# Patient Record
Sex: FEMALE | Race: WHITE | NOT HISPANIC OR LATINO
[De-identification: names, ages, dates, MRNs, and addresses within clinical notes are randomized per-mention and may not be internally consistent; named-entity substitution may affect disease eponyms.]

---

## 2017-01-04 ENCOUNTER — RX RENEWAL (OUTPATIENT)
Age: 67
End: 2017-01-04

## 2017-03-08 ENCOUNTER — MESSAGE (OUTPATIENT)
Age: 67
End: 2017-03-08

## 2017-03-08 ENCOUNTER — INPATIENT (INPATIENT)
Facility: HOSPITAL | Age: 67
LOS: 4 days | Discharge: HOME HEALTH | DRG: 641 | End: 2017-03-13
Attending: INTERNAL MEDICINE | Admitting: INTERNAL MEDICINE
Payer: MEDICARE

## 2017-03-08 VITALS
RESPIRATION RATE: 17 BRPM | WEIGHT: 177.91 LBS | HEART RATE: 75 BPM | TEMPERATURE: 97 F | DIASTOLIC BLOOD PRESSURE: 68 MMHG | OXYGEN SATURATION: 96 % | HEIGHT: 63 IN | SYSTOLIC BLOOD PRESSURE: 120 MMHG

## 2017-03-08 LAB
ALBUMIN SERPL ELPH-MCNC: 2.4 G/DL — LOW (ref 3.4–5)
ALP SERPL-CCNC: 136 U/L — HIGH (ref 40–120)
ALT FLD-CCNC: 17 U/L — SIGNIFICANT CHANGE UP (ref 12–42)
ANION GAP SERPL CALC-SCNC: 9 MMOL/L — SIGNIFICANT CHANGE UP (ref 9–16)
ANISOCYTOSIS BLD QL: SLIGHT — SIGNIFICANT CHANGE UP
APPEARANCE UR: CLEAR — SIGNIFICANT CHANGE UP
APTT BLD: 31.3 SEC — SIGNIFICANT CHANGE UP (ref 27.5–37.4)
AST SERPL-CCNC: 22 U/L — SIGNIFICANT CHANGE UP (ref 15–37)
BACTERIA # UR AUTO: PRESENT /HPF
BASOPHILS NFR BLD AUTO: 0.5 % — SIGNIFICANT CHANGE UP (ref 0–2)
BILIRUB SERPL-MCNC: 1.2 MG/DL — SIGNIFICANT CHANGE UP (ref 0.2–1.2)
BILIRUB UR-MCNC: (no result)
BUN SERPL-MCNC: 11 MG/DL — SIGNIFICANT CHANGE UP (ref 7–23)
CALCIUM SERPL-MCNC: 8.6 MG/DL — SIGNIFICANT CHANGE UP (ref 8.5–10.5)
CHLORIDE SERPL-SCNC: 107 MMOL/L — SIGNIFICANT CHANGE UP (ref 96–108)
CK MB CFR SERPL CALC: 1.2 NG/ML — SIGNIFICANT CHANGE UP (ref 0.5–3.6)
CK SERPL-CCNC: 103 U/L — SIGNIFICANT CHANGE UP (ref 26–192)
CO2 SERPL-SCNC: 24 MMOL/L — SIGNIFICANT CHANGE UP (ref 22–31)
COLOR SPEC: YELLOW — SIGNIFICANT CHANGE UP
CREAT SERPL-MCNC: 0.96 MG/DL — SIGNIFICANT CHANGE UP (ref 0.5–1.3)
DACRYOCYTES BLD QL SMEAR: SLIGHT — SIGNIFICANT CHANGE UP
DIFF PNL FLD: NEGATIVE — SIGNIFICANT CHANGE UP
EOSINOPHIL NFR BLD AUTO: 5 % — SIGNIFICANT CHANGE UP (ref 0–6)
EPI CELLS # UR: (no result) /HPF
GIANT PLATELETS BLD QL SMEAR: PRESENT — SIGNIFICANT CHANGE UP
GLUCOSE SERPL-MCNC: 85 MG/DL — SIGNIFICANT CHANGE UP (ref 70–99)
GLUCOSE UR QL: NEGATIVE — SIGNIFICANT CHANGE UP
HCT VFR BLD CALC: 29.2 % — LOW (ref 34.5–45)
HGB BLD-MCNC: 9.1 G/DL — LOW (ref 11.5–15.5)
HYPOCHROMIA BLD QL: SLIGHT — SIGNIFICANT CHANGE UP
INR BLD: 1.34 — HIGH (ref 0.88–1.16)
KETONES UR-MCNC: 15 MG/DL
LEUKOCYTE ESTERASE UR-ACNC: NEGATIVE — SIGNIFICANT CHANGE UP
LG PLATELETS BLD QL AUTO: PRESENT — SIGNIFICANT CHANGE UP
LYMPHOCYTES # BLD AUTO: 3 % — LOW (ref 13–44)
MAGNESIUM SERPL-MCNC: 1.8 MG/DL — SIGNIFICANT CHANGE UP (ref 1.6–2.4)
MCHC RBC-ENTMCNC: 29.3 PG — SIGNIFICANT CHANGE UP (ref 27–34)
MCHC RBC-ENTMCNC: 31.2 G/DL — LOW (ref 32–36)
MCV RBC AUTO: 93.9 FL — SIGNIFICANT CHANGE UP (ref 80–100)
MONOCYTES NFR BLD AUTO: 15 % — HIGH (ref 2–14)
NEUTROPHILS NFR BLD AUTO: 76 % — SIGNIFICANT CHANGE UP (ref 43–77)
NEUTS BAND # BLD: 1 % — SIGNIFICANT CHANGE UP
NITRITE UR-MCNC: NEGATIVE — SIGNIFICANT CHANGE UP
OVALOCYTES BLD QL SMEAR: SLIGHT — SIGNIFICANT CHANGE UP
PH UR: 5 — SIGNIFICANT CHANGE UP (ref 4–8)
PLAT MORPH BLD: (no result)
PLATELET # BLD AUTO: 128 K/UL — LOW (ref 150–400)
POLYCHROMASIA BLD QL SMEAR: SLIGHT — SIGNIFICANT CHANGE UP
POTASSIUM SERPL-MCNC: 3.6 MMOL/L — SIGNIFICANT CHANGE UP (ref 3.5–5.3)
POTASSIUM SERPL-SCNC: 3.6 MMOL/L — SIGNIFICANT CHANGE UP (ref 3.5–5.3)
PROT SERPL-MCNC: 6.4 G/DL — SIGNIFICANT CHANGE UP (ref 6.4–8.2)
PROT UR-MCNC: (no result) MG/DL
PROTHROM AB SERPL-ACNC: 14.9 SEC — HIGH (ref 10–13.1)
RBC # BLD: 3.11 M/UL — LOW (ref 3.8–5.2)
RBC # FLD: 15.7 % — SIGNIFICANT CHANGE UP (ref 10.3–16.9)
RBC BLD AUTO: (no result)
SODIUM SERPL-SCNC: 140 MMOL/L — SIGNIFICANT CHANGE UP (ref 135–145)
SP GR SPEC: 1.02 — SIGNIFICANT CHANGE UP (ref 1–1.03)
TROPONIN I SERPL-MCNC: 0.02 NG/ML — SIGNIFICANT CHANGE UP (ref 0.01–0.04)
TSH SERPL-MCNC: 2.33 UIU/ML — SIGNIFICANT CHANGE UP (ref 0.35–4.94)
UROBILINOGEN FLD QL: 1 E.U./DL — SIGNIFICANT CHANGE UP
WBC # BLD: 4 K/UL — SIGNIFICANT CHANGE UP (ref 3.8–10.5)
WBC # FLD AUTO: 4 K/UL — SIGNIFICANT CHANGE UP (ref 3.8–10.5)
WBC UR QL: < 5 /HPF — SIGNIFICANT CHANGE UP

## 2017-03-08 PROCEDURE — 93010 ELECTROCARDIOGRAM REPORT: CPT

## 2017-03-08 PROCEDURE — 71010: CPT | Mod: 26

## 2017-03-08 PROCEDURE — 99285 EMERGENCY DEPT VISIT HI MDM: CPT | Mod: 25

## 2017-03-08 RX ORDER — MORPHINE SULFATE 50 MG/1
4 CAPSULE, EXTENDED RELEASE ORAL ONCE
Qty: 0 | Refills: 0 | Status: DISCONTINUED | OUTPATIENT
Start: 2017-03-08 | End: 2017-03-08

## 2017-03-08 RX ADMIN — MORPHINE SULFATE 4 MILLIGRAM(S): 50 CAPSULE, EXTENDED RELEASE ORAL at 22:30

## 2017-03-08 RX ADMIN — MORPHINE SULFATE 4 MILLIGRAM(S): 50 CAPSULE, EXTENDED RELEASE ORAL at 21:23

## 2017-03-08 NOTE — ED PROVIDER NOTE - OBJECTIVE STATEMENT
weakness  65 yo with breast CA , generalized weakness, lack of appetite and decreased PO intake, felt wobbly for the last few days , one week after her last chemo, yesterday fell to the ground as her daughter reports her legs gave out. pt denies injury, has pain at L breast particularly at sight of ozzing under breast which has been chronic but recently worsening. no fevers,no cough, no headache, no black or bloody stools, no urinary symptoms reports leg edema is chronic and chronically worse on the L side , no SOB,

## 2017-03-08 NOTE — ED ADULT NURSE NOTE - CHPI ED SYMPTOMS NEG
no chills/no nausea/no vomiting/no dizziness/no numbness/no fever/no tingling/no decreased eating/drinking/no pain

## 2017-03-08 NOTE — ED PROVIDER NOTE - PHYSICAL EXAMINATION
CHest wall: L breast hardened with chronic radiation changes and likely from Breast CA, nodular texture, under L breast oozing from rash but no fluctuance,

## 2017-03-08 NOTE — ED ADULT TRIAGE NOTE - CHIEF COMPLAINT QUOTE
pt c/o increased weakness since Friday. pt also c/o discharge from under left breast, was unable to see wound nurse last week. pt is currently under chemo for breast cancer.

## 2017-03-08 NOTE — ED PROVIDER NOTE - MUSCULOSKELETAL, MLM
Spine appears normal, range of motion is not limited,+ bilateral lower extremity edema L slightly greater that R although pt reports this assymetry is chronic for her

## 2017-03-08 NOTE — ED ADULT NURSE NOTE - OBJECTIVE STATEMENT
Pt came to ED complaining of generalized weakness x2 weeks. Pt has breast CA and undergoing chemotherapy. Pt reports fall at home today. Pt denies LOC or head trauma. Positive PMS x4 extremities, Pt is alert and oriented x3. Pt has oozing from left breast.  Pt denies chest pain, SOB, abd pain, /GI symptoms.

## 2017-03-08 NOTE — ED PROVIDER NOTE - MEDICAL DECISION MAKING DETAILS
65 YO w generalized weakness, + breast CA, consider secondary to lack of oral intake/ dehydration, no apparent pna or UTI, abd soft and NT, will admit to medicine for further evaluation and treatment.

## 2017-03-09 DIAGNOSIS — E11.9 TYPE 2 DIABETES MELLITUS WITHOUT COMPLICATIONS: ICD-10-CM

## 2017-03-09 DIAGNOSIS — Z41.8 ENCOUNTER FOR OTHER PROCEDURES FOR PURPOSES OTHER THAN REMEDYING HEALTH STATE: ICD-10-CM

## 2017-03-09 DIAGNOSIS — R63.8 OTHER SYMPTOMS AND SIGNS CONCERNING FOOD AND FLUID INTAKE: ICD-10-CM

## 2017-03-09 DIAGNOSIS — R53.1 WEAKNESS: ICD-10-CM

## 2017-03-09 DIAGNOSIS — I10 ESSENTIAL (PRIMARY) HYPERTENSION: ICD-10-CM

## 2017-03-09 DIAGNOSIS — N18.3 CHRONIC KIDNEY DISEASE, STAGE 3 (MODERATE): ICD-10-CM

## 2017-03-09 DIAGNOSIS — C50.919 MALIGNANT NEOPLASM OF UNSPECIFIED SITE OF UNSPECIFIED FEMALE BREAST: ICD-10-CM

## 2017-03-09 DIAGNOSIS — Z90.12 ACQUIRED ABSENCE OF LEFT BREAST AND NIPPLE: Chronic | ICD-10-CM

## 2017-03-09 DIAGNOSIS — Z95.810 PRESENCE OF AUTOMATIC (IMPLANTABLE) CARDIAC DEFIBRILLATOR: Chronic | ICD-10-CM

## 2017-03-09 DIAGNOSIS — I50.9 HEART FAILURE, UNSPECIFIED: ICD-10-CM

## 2017-03-09 LAB
ANION GAP SERPL CALC-SCNC: 11 MMOL/L — SIGNIFICANT CHANGE UP (ref 9–16)
BUN SERPL-MCNC: 13 MG/DL — SIGNIFICANT CHANGE UP (ref 7–23)
CALCIUM SERPL-MCNC: 8.4 MG/DL — LOW (ref 8.5–10.5)
CHLORIDE SERPL-SCNC: 107 MMOL/L — SIGNIFICANT CHANGE UP (ref 96–108)
CO2 SERPL-SCNC: 23 MMOL/L — SIGNIFICANT CHANGE UP (ref 22–31)
CREAT SERPL-MCNC: 1.19 MG/DL — SIGNIFICANT CHANGE UP (ref 0.5–1.3)
EOSINOPHIL NFR BLD AUTO: 4 % — SIGNIFICANT CHANGE UP (ref 0–6)
FERRITIN SERPL-MCNC: 388.3 NG/ML — HIGH (ref 8–252)
GLUCOSE SERPL-MCNC: 106 MG/DL — HIGH (ref 70–99)
HBA1C BLD-MCNC: 6.6 % — HIGH (ref 4.8–5.6)
HCT VFR BLD CALC: 26.7 % — LOW (ref 34.5–45)
HGB BLD-MCNC: 8.2 G/DL — LOW (ref 11.5–15.5)
IRON SATN MFR SERPL: 27 % — SIGNIFICANT CHANGE UP (ref 20–38)
IRON SATN MFR SERPL: 44 UG/DL — LOW (ref 50–170)
LYMPHOCYTES # BLD AUTO: 3 % — LOW (ref 13–44)
MAGNESIUM SERPL-MCNC: 1.9 MG/DL — SIGNIFICANT CHANGE UP (ref 1.6–2.4)
MCHC RBC-ENTMCNC: 28.8 PG — SIGNIFICANT CHANGE UP (ref 27–34)
MCHC RBC-ENTMCNC: 30.7 G/DL — LOW (ref 32–36)
MCV RBC AUTO: 93.7 FL — SIGNIFICANT CHANGE UP (ref 80–100)
MONOCYTES NFR BLD AUTO: 13 % — SIGNIFICANT CHANGE UP (ref 2–14)
NEUTROPHILS NFR BLD AUTO: 79 % — HIGH (ref 43–77)
PLATELET # BLD AUTO: 116 K/UL — LOW (ref 150–400)
POTASSIUM SERPL-MCNC: 3.6 MMOL/L — SIGNIFICANT CHANGE UP (ref 3.5–5.3)
POTASSIUM SERPL-SCNC: 3.6 MMOL/L — SIGNIFICANT CHANGE UP (ref 3.5–5.3)
RBC # BLD: 2.85 M/UL — LOW (ref 3.8–5.2)
RBC # FLD: 16.4 % — SIGNIFICANT CHANGE UP (ref 10.3–16.9)
SODIUM SERPL-SCNC: 141 MMOL/L — SIGNIFICANT CHANGE UP (ref 135–145)
TIBC SERPL-MCNC: 161 UG/DL — LOW (ref 250–450)
TSH SERPL-MCNC: 2.96 UIU/ML — SIGNIFICANT CHANGE UP (ref 0.35–4.94)
WBC # BLD: 4.1 K/UL — SIGNIFICANT CHANGE UP (ref 3.8–10.5)
WBC # FLD AUTO: 4.1 K/UL — SIGNIFICANT CHANGE UP (ref 3.8–10.5)

## 2017-03-09 PROCEDURE — 99223 1ST HOSP IP/OBS HIGH 75: CPT

## 2017-03-09 RX ORDER — MORPHINE SULFATE 50 MG/1
2 CAPSULE, EXTENDED RELEASE ORAL ONCE
Qty: 0 | Refills: 0 | Status: DISCONTINUED | OUTPATIENT
Start: 2017-03-09 | End: 2017-03-09

## 2017-03-09 RX ORDER — INSULIN LISPRO 100/ML
8 VIAL (ML) SUBCUTANEOUS
Qty: 0 | Refills: 0 | Status: DISCONTINUED | OUTPATIENT
Start: 2017-03-09 | End: 2017-03-13

## 2017-03-09 RX ORDER — DIAZEPAM 5 MG
2 TABLET ORAL EVERY 12 HOURS
Qty: 0 | Refills: 0 | Status: DISCONTINUED | OUTPATIENT
Start: 2017-03-09 | End: 2017-03-13

## 2017-03-09 RX ORDER — FENTANYL CITRATE 50 UG/ML
1 INJECTION INTRAVENOUS
Qty: 0 | Refills: 0 | COMMUNITY

## 2017-03-09 RX ORDER — OXYCODONE HYDROCHLORIDE 5 MG/1
5 TABLET ORAL ONCE
Qty: 0 | Refills: 0 | Status: DISCONTINUED | OUTPATIENT
Start: 2017-03-09 | End: 2017-03-09

## 2017-03-09 RX ORDER — ONDANSETRON 8 MG/1
1 TABLET, FILM COATED ORAL
Qty: 0 | Refills: 0 | COMMUNITY

## 2017-03-09 RX ORDER — FENTANYL CITRATE 50 UG/ML
1 INJECTION INTRAVENOUS
Qty: 0 | Refills: 0 | Status: DISCONTINUED | OUTPATIENT
Start: 2017-03-09 | End: 2017-03-13

## 2017-03-09 RX ORDER — OXYCODONE HYDROCHLORIDE 5 MG/1
15 TABLET ORAL EVERY 4 HOURS
Qty: 0 | Refills: 0 | Status: DISCONTINUED | OUTPATIENT
Start: 2017-03-09 | End: 2017-03-13

## 2017-03-09 RX ORDER — ONDANSETRON 8 MG/1
4 TABLET, FILM COATED ORAL EVERY 8 HOURS
Qty: 0 | Refills: 0 | Status: DISCONTINUED | OUTPATIENT
Start: 2017-03-09 | End: 2017-03-13

## 2017-03-09 RX ORDER — INSULIN LISPRO 100/ML
8 VIAL (ML) SUBCUTANEOUS
Qty: 0 | Refills: 0 | COMMUNITY

## 2017-03-09 RX ORDER — MORPHINE SULFATE 50 MG/1
2 CAPSULE, EXTENDED RELEASE ORAL EVERY 4 HOURS
Qty: 0 | Refills: 0 | Status: DISCONTINUED | OUTPATIENT
Start: 2017-03-09 | End: 2017-03-09

## 2017-03-09 RX ORDER — INSULIN LISPRO 100/ML
VIAL (ML) SUBCUTANEOUS
Qty: 0 | Refills: 0 | Status: DISCONTINUED | OUTPATIENT
Start: 2017-03-09 | End: 2017-03-13

## 2017-03-09 RX ORDER — METOPROLOL TARTRATE 50 MG
1 TABLET ORAL
Qty: 0 | Refills: 0 | COMMUNITY

## 2017-03-09 RX ORDER — OXYCODONE HYDROCHLORIDE 5 MG/1
2 TABLET ORAL
Qty: 0 | Refills: 0 | COMMUNITY

## 2017-03-09 RX ORDER — OXYCODONE HYDROCHLORIDE 5 MG/1
15 TABLET ORAL ONCE
Qty: 0 | Refills: 0 | Status: DISCONTINUED | OUTPATIENT
Start: 2017-03-09 | End: 2017-03-09

## 2017-03-09 RX ORDER — INSULIN GLARGINE 100 [IU]/ML
60 INJECTION, SOLUTION SUBCUTANEOUS AT BEDTIME
Qty: 0 | Refills: 0 | Status: DISCONTINUED | OUTPATIENT
Start: 2017-03-09 | End: 2017-03-09

## 2017-03-09 RX ORDER — DIAZEPAM 5 MG
1 TABLET ORAL
Qty: 0 | Refills: 0 | COMMUNITY

## 2017-03-09 RX ORDER — ENOXAPARIN SODIUM 100 MG/ML
40 INJECTION SUBCUTANEOUS DAILY
Qty: 0 | Refills: 0 | Status: DISCONTINUED | OUTPATIENT
Start: 2017-03-09 | End: 2017-03-13

## 2017-03-09 RX ORDER — ASPIRIN/CALCIUM CARB/MAGNESIUM 324 MG
81 TABLET ORAL DAILY
Qty: 0 | Refills: 0 | Status: DISCONTINUED | OUTPATIENT
Start: 2017-03-09 | End: 2017-03-13

## 2017-03-09 RX ORDER — INSULIN GLARGINE 100 [IU]/ML
20 INJECTION, SOLUTION SUBCUTANEOUS AT BEDTIME
Qty: 0 | Refills: 0 | Status: DISCONTINUED | OUTPATIENT
Start: 2017-03-09 | End: 2017-03-10

## 2017-03-09 RX ORDER — ACETAMINOPHEN 500 MG
2 TABLET ORAL
Qty: 0 | Refills: 0 | COMMUNITY

## 2017-03-09 RX ORDER — METOPROLOL TARTRATE 50 MG
100 TABLET ORAL DAILY
Qty: 0 | Refills: 0 | Status: DISCONTINUED | OUTPATIENT
Start: 2017-03-09 | End: 2017-03-13

## 2017-03-09 RX ORDER — ASPIRIN/CALCIUM CARB/MAGNESIUM 324 MG
1 TABLET ORAL
Qty: 0 | Refills: 0 | COMMUNITY

## 2017-03-09 RX ORDER — SODIUM CHLORIDE 9 MG/ML
1000 INJECTION INTRAMUSCULAR; INTRAVENOUS; SUBCUTANEOUS
Qty: 0 | Refills: 0 | Status: DISCONTINUED | OUTPATIENT
Start: 2017-03-09 | End: 2017-03-10

## 2017-03-09 RX ORDER — POLYETHYLENE GLYCOL 3350 17 G/17G
17 POWDER, FOR SOLUTION ORAL DAILY
Qty: 0 | Refills: 0 | Status: DISCONTINUED | OUTPATIENT
Start: 2017-03-09 | End: 2017-03-13

## 2017-03-09 RX ADMIN — Medication 81 MILLIGRAM(S): at 09:37

## 2017-03-09 RX ADMIN — OXYCODONE HYDROCHLORIDE 15 MILLIGRAM(S): 5 TABLET ORAL at 14:52

## 2017-03-09 RX ADMIN — Medication 8 UNIT(S): at 09:47

## 2017-03-09 RX ADMIN — INSULIN GLARGINE 20 UNIT(S): 100 INJECTION, SOLUTION SUBCUTANEOUS at 22:10

## 2017-03-09 RX ADMIN — SODIUM CHLORIDE 100 MILLILITER(S): 9 INJECTION INTRAMUSCULAR; INTRAVENOUS; SUBCUTANEOUS at 02:46

## 2017-03-09 RX ADMIN — Medication 8 UNIT(S): at 13:23

## 2017-03-09 RX ADMIN — Medication 100 MILLIGRAM(S): at 06:46

## 2017-03-09 RX ADMIN — Medication 2 MILLIGRAM(S): at 17:22

## 2017-03-09 RX ADMIN — OXYCODONE HYDROCHLORIDE 15 MILLIGRAM(S): 5 TABLET ORAL at 10:30

## 2017-03-09 RX ADMIN — OXYCODONE HYDROCHLORIDE 15 MILLIGRAM(S): 5 TABLET ORAL at 15:45

## 2017-03-09 RX ADMIN — OXYCODONE HYDROCHLORIDE 5 MILLIGRAM(S): 5 TABLET ORAL at 05:46

## 2017-03-09 RX ADMIN — POLYETHYLENE GLYCOL 3350 17 GRAM(S): 17 POWDER, FOR SOLUTION ORAL at 09:37

## 2017-03-09 RX ADMIN — FENTANYL CITRATE 1 PATCH: 50 INJECTION INTRAVENOUS at 17:22

## 2017-03-09 RX ADMIN — OXYCODONE HYDROCHLORIDE 15 MILLIGRAM(S): 5 TABLET ORAL at 09:48

## 2017-03-09 RX ADMIN — Medication 8 UNIT(S): at 18:05

## 2017-03-09 RX ADMIN — MORPHINE SULFATE 2 MILLIGRAM(S): 50 CAPSULE, EXTENDED RELEASE ORAL at 11:25

## 2017-03-09 RX ADMIN — MORPHINE SULFATE 2 MILLIGRAM(S): 50 CAPSULE, EXTENDED RELEASE ORAL at 02:46

## 2017-03-09 RX ADMIN — OXYCODONE HYDROCHLORIDE 5 MILLIGRAM(S): 5 TABLET ORAL at 04:46

## 2017-03-09 RX ADMIN — MORPHINE SULFATE 2 MILLIGRAM(S): 50 CAPSULE, EXTENDED RELEASE ORAL at 03:01

## 2017-03-09 RX ADMIN — ENOXAPARIN SODIUM 40 MILLIGRAM(S): 100 INJECTION SUBCUTANEOUS at 09:38

## 2017-03-09 RX ADMIN — Medication 50 MILLIGRAM(S): at 17:22

## 2017-03-09 RX ADMIN — MORPHINE SULFATE 2 MILLIGRAM(S): 50 CAPSULE, EXTENDED RELEASE ORAL at 11:09

## 2017-03-09 NOTE — H&P ADULT - PROBLEM SELECTOR PLAN 5
Per daughter mother has Hx of CKD stage 3. Current calculated GFR Of Per daughter mother has Hx of CKD stage 3. Currently BUN/Cr: 11/0.96. Current calculated GFR of 71 placing patient at Stage 2  -Cont with Maintaince fluid ON

## 2017-03-09 NOTE — CONSULT NOTE ADULT - SUBJECTIVE AND OBJECTIVE BOX
Pain Management Consult Note - [facility] [phone]    Chief Complaint:  breast cancer  HPI:  Pt is a 67 yo F with Pmhx of HTN, HLD, DM2, CKD Stage 3, HF s/p AICD placement, previous MI,  Left Breast Ca s/p Lumpectomy Radiation and Chemotherapy previously in Remission with reoccurrence in November 2016 with Bone mets who presents to the ED for weakness, poor PO intake and recent fall. Per daughter at the bedside her mother had been doing relatively ok and tolerating her chemo session well. However since Her last session on Friday she has noted her mother to be weaker. Yesterday as she was standing she fell to the floor, stating that her legs gave out. The fall was witnessed by her daughter and the pt did not hit her head. The patient has been in tremendous pain due to wounds under her leftt breast. She developed ''foul smelling discharge'' under her left breast which requires wound care, however given her recent debilitating weakness she has missed her last appointments.  The patient states she is on Fentanyl patch 25 mcg/hr q72h, oxycodone 15 mg po TID, Lyrica 50 mg BID, Valium 2 mg po BID and this was confirmed in [facility].  Dr. Truong, pt's oncologist, writes pain medications.  Pt. complains of pain under the left breast and states she has been feeling "tired".  Pt. states oxycodone is helpful.          Pain is _x__ sharp __x__dull _x__burning _x__achy ___ Intensity: ____ mild ___mod ___severe     Location ____surgical site ____cervical _____lumbar ____abd ____upper ext____lower ext under left breast    Worse with __x__activity _x___movement _____physical therapy___ Rest  dressing changes    Improved with _x___medication __x__rest ____physical therapy    ROS: Const:  _-__febrile   Eyes:___ENT:_-__CV: _-__chest pain  Resp: __-__sob  GI:__-_nausea _-__vomiting _-__abd pain ___npo ___clears _+_full diet __bm  :_-__ Musk: __-_pain ___spasm  Skin:_+__ Neuro:  _-__kgplolih__-_havdkmkdd_-__ numbness -___weakness ___paresth  Psych:__anxiety  Endo:__+_ Heme:__-_Allergy:__codeine_______, ___all others reviewed and negative    PAST MEDICAL & SURGICAL HISTORY:  Neuropathy  Heart failure  AICD (automatic cardioverter/defibrillator) present  MI, old  Diabetes  HLD (hyperlipidemia)  HTN (hypertension)  Breast CA  Status post left breast lumpectomy  AICD (automatic cardioverter/defibrillator) present      SH: __-_Tobacco   _-__Alcohol                          FH:FAMILY HISTORY:  No pertinent family history in first degree relatives      enoxaparin Injectable 40milliGRAM(s) SubCutaneous daily  aspirin enteric coated 81milliGRAM(s) Oral daily  fentaNYL   Patch  25 MICROgram(s)/Hr 1Patch Transdermal every 72 hours  insulin lispro Injectable (HumaLOG) 8Unit(s) SubCutaneous three times a day before meals  ondansetron    Tablet 4milliGRAM(s) Oral every 8 hours PRN  metoprolol succinate ER 100milliGRAM(s) Oral daily  sodium chloride 0.9%. 1000milliLiter(s) IV Continuous <Continuous>  insulin lispro (HumaLOG) corrective regimen sliding scale  SubCutaneous three times a day before meals  polyethylene glycol 3350 17Gram(s) Oral daily  insulin glargine Injectable (LANTUS) 20Unit(s) SubCutaneous at bedtime  oxyCODONE IR 15milliGRAM(s) Oral every 4 hours PRN      T(C): 36.1, Max: 36.6 (03-09 @ 04:45)  HR: 77 (69 - 77)  BP: 109/60 (92/62 - 120/68)  RR: 18 (17 - 20)  SpO2: 97% (94% - 97%)  Wt(kg): --    T(C): 36.1, Max: 36.6 (03-09 @ 04:45)  HR: 77 (69 - 77)  BP: 109/60 (92/62 - 120/68)  RR: 18 (17 - 20)  SpO2: 97% (94% - 97%)  Wt(kg): --    T(C): 36.1, Max: 36.6 (03-09 @ 04:45)  HR: 77 (69 - 77)  BP: 109/60 (92/62 - 120/68)  RR: 18 (17 - 20)  SpO2: 97% (94% - 97%)  Wt(kg): --    PHYSICAL EXAM:  Gen Appearance: __x_no acute distress _x__appropriate        Neuro: __x_SILT feet__x__ EOM Intact Psych: AAOX__3, __x_mood/affect appropriate        Eyes: __x_conjunctiva WNL  __x___ Pupils equal and round        ENT: _x__ears and nose atraumatic_x__ Hearing grossly intact        Neck: ___trachea midline, no visible masses ___thyroid without palpable mass    Resp: _x__Nml WOB____No tactile fremitus ___clear to auscultation    Cardio: _x__extremities free from edema ____pedal pulses palpable    GI/Abdomen: __x_soft ___x__ Nontender______Nondistended_____HSM    Lymphatic: ___no palpable nodes in neck  ___no palpable nodes calves and feet    Skin/Wound: ___Incision, _x__Dressing c/d/i to left breast,   ____surrounding tissues soft,  ___drain/chest tube present____    Muscular: EHL __5_/5  Gastrocnemius___/5    _x__absent clubbing/cyanosis      ASSESSMENT: This is a 66y old Female with a history of   FAILURE TO THRIVE IN ADULT: FAILURE TO THRIVE IN ADULT  Neuropathy  Heart failure  AICD (automatic cardioverter/defibrillator) present  MI, old  Diabetes  HLD (hyperlipidemia)  HTN (hypertension)  Breast CA  Failure to thrive in adult  HTN (hypertension): HTN (hypertension)  CKD (chronic kidney disease) stage 3, GFR 30-59 ml/min: CKD (chronic kidney disease) stage 3, GFR 30-59 ml/min  Heart failure: Heart failure  Metastatic breast cancer: Metastatic breast cancer  Weakness: Weakness  Status post left breast lumpectomy  AICD (automatic cardioverter/defibrillator) present  WEAKNESS: WEAKNESS  left breast cancer and pain in the left breast that is moderately controlled per patient.     Recommended Treatment PLAN:  1..Suggest continue fentanyl patch 25 mcg/hr q72h  2.  Suggest continue oxycodone 15 mg po q4h prn  3.  Suggest continue lyrica 50 mg po BID  4.Suggest continue valium 2mg po BID prn  5.  Consider dilaudid 0.5 mg IV qd prn for dressing changes.

## 2017-03-09 NOTE — ADVANCED PRACTICE NURSE CONSULT - ASSESSMENT
Pt is a 67 yo F with Pmhx of HTN, HLD, DM2, CKD Stage 3, HF s/p AICD placement, previous MI,  Left Breast Ca s/p Lumpectomy Radiation and Chemotherapy previously in Remission with reoccurrence in November 2016 with Bone mets who presents to the ED for weakness, poor PO intake and recent fall. Pt with fungal rash beneath left breast, cleaned site well, dried, then placed InterDry sheet beneath breast. Pt premedicated prior to assessment.

## 2017-03-09 NOTE — H&P ADULT - PROBLEM SELECTOR PLAN 1
Pt presenting with worsening weakness, poor PO intake and recent mechanical fall. Pt's weakness likely multifactorial: Metastatic breast cancer, immunosuppression from chemotherapy and deconditioning  -Pt looks significantly dry on physical exam  -Will start on Maintaince fluid.  -TSH added pending  -Pt consult in the am Pt presenting with worsening weakness, poor PO intake and recent mechanical fall. Pt's weakness likely multifactorial: Metastatic breast cancer, immunosuppression from chemotherapy and deconditioning  -Pt looks significantly dry on physical exam, Will start on Maintaince fluid.  -TSH added pending for further eval of weakness  -Pt consult in the am

## 2017-03-09 NOTE — DIETITIAN INITIAL EVALUATION ADULT. - ENERGY NEEDS
BMI:31.1,IBW:120lbs,149% of IBW. No N/V/D or pain. Noted derm/breast oozing issues.Flds per MD due to renal insufficiency.

## 2017-03-09 NOTE — H&P ADULT - ASSESSMENT
67 Yo F with PMhx as mentioned above presenting with weakness and decrease PO intake in setting of Breast Malignancy

## 2017-03-09 NOTE — H&P ADULT - PROBLEM SELECTOR PLAN 2
Pt with Metastatic left breast cancer to the Bone.  -Left breast significantly hardened with darkening of the skin. Breast is almost immobile. There are skin wounds under the left breast tissue with strong Malodorous discharge. Pt also with significant lymphedema of her LUE as well as tenderness  -Wound care in the morning  -Cont with Morphine and Percocet PRN for pain  -Contact outpatient Oncologist in the am for further collateral. Dr Zhu has not seen the patient in clinic in over a year  -Pt would like to remain full code.

## 2017-03-09 NOTE — PHYSICAL THERAPY INITIAL EVALUATION ADULT - ADDITIONAL COMMENTS
Pt reports prior to hospitalization she was ambulating independently using a straight cane. No HHA. Lived with daughter who worked during the day however would assist in taking her to appointments.

## 2017-03-09 NOTE — PHYSICAL THERAPY INITIAL EVALUATION ADULT - ACTIVE RANGE OF MOTION EXAMINATION, REHAB EVAL
bilateral UE AROM dec: RUE AROM 90 degrees, LUE AROM 45 degrees/bilateral  lower extremity Active ROM was WFL (within functional limits)

## 2017-03-09 NOTE — DIETITIAN INITIAL EVALUATION ADULT. - OTHER INFO
67 y/o female admitted with FTT.Noted extensive comorbidities.As per RN,patient ate 80% of breakfast tray without incident.No N/V/D or complaints of pain

## 2017-03-09 NOTE — PATIENT PROFILE ADULT. - VISION (WITH CORRECTIVE LENSES IF THE PATIENT USUALLY WEARS THEM):
wears glasses for near sightedness/Partially impaired: cannot see medication labels or newsprint, but can see obstacles in path, and the surrounding layout; can count fingers at arm's length

## 2017-03-09 NOTE — H&P ADULT - NSHPLABSRESULTS_GEN_ALL_CORE
.  LABS:                         9.1    4.0   )-----------( 128      ( 08 Mar 2017 21:03 )             29.2     08 Mar 2017 21:03    140    |  107    |  11     ----------------------------<  85     3.6     |  24     |  0.96     Ca    8.6        08 Mar 2017 21:03  Mg     1.8       08 Mar 2017 21:03    TPro  6.4    /  Alb  2.4    /  TBili  1.2    /  DBili  x      /  AST  22     /  ALT  17     /  AlkPhos  136    08 Mar 2017 21:03    PT/INR - ( 08 Mar 2017 21:03 )   PT: 14.9 sec;   INR: 1.34          PTT - ( 08 Mar 2017 21:03 )  PTT:31.3 sec  Urinalysis Basic - ( 08 Mar 2017 22:41 )    Color: Yellow / Appearance: Clear / S.020 / pH: x  Gluc: x / Ketone: 15 mg/dL  / Bili: Moderate / Urobili: 1.0 E.U./dL   Blood: x / Protein: Trace mg/dL / Nitrite: NEGATIVE   Leuk Esterase: NEGATIVE / RBC: x / WBC < 5 /HPF   Sq Epi: x / Non Sq Epi: Many /HPF / Bacteria: Present /HPF      CARDIAC MARKERS ( 08 Mar 2017 21:03 )  0.016 ng/mL / x     / 103 U/L / x     / 1.2 ng/mL      RADIOLOGY, EKG & ADDITIONAL TESTS: Reviewed.

## 2017-03-09 NOTE — H&P ADULT - HISTORY OF PRESENT ILLNESS
In the ED VS: T 97.7 BP: 111-120/68-69 HR:69-75 RR:17-18 Satting 94-96 RA  ED administrations: Morphine 4 mg IV x1 Pt is a 67 yo F with Pmhx of HTN, HLD, DM2, CKD Stage 3, HF s/p AICD placement, previous MI,  Left Breast Ca s/p Lumpectomy Radiation and Chemotherapy previously in Remission with reoccurrence in November 2016 with Bone mets who presents to the ED for weakness, poor PO intake and recent fall. Per daughter at the bedside her mother had been doing relatively ok and tolerating her chemo session well. However since Her last session on Friday she has noted her mother to be weaker. Yesterday as she was standing she fell to the floor, as she felt her legs give out. The fall was witnessed by her daughter and the pt did not hit her head. Daughter at the bedside also mentions that her mother has been in tremendous pain as well at the site of her breast. She developped ''foul smelling discharge'' under her left breast which requires wound care, however given her recent debilitating weakness she has missed her last appointments.  ROS is negative for fever, chills, productive cough, n/v, GI disturbances or dysuria. The patient's daughter has just been diagnosed with PNA    In the ED VS: T 97.7 BP: 111-120/68-69 HR:69-75 RR:17-18 Satting 94-96 RA  ED administrations: Morphine 4 mg IV x1 Pt is a 65 yo F with Pmhx of HTN, HLD, DM2, CKD Stage 3, HF s/p AICD placement, previous MI,  Left Breast Ca s/p Lumpectomy Radiation and Chemotherapy previously in Remission with reoccurrence in November 2016 with Bone mets who presents to the ED for weakness, poor PO intake and recent fall. Per daughter at the bedside her mother had been doing relatively ok and tolerating her chemo session well. However since Her last session on Friday she has noted her mother to be weaker. Yesterday as she was standing she fell to the floor, stating that her legs gave out. The fall was witnessed by her daughter and the pt did not hit her head. The patient has been in tremendous pain due to wounds under her leftt breast. She developed ''foul smelling discharge'' under her left breast which requires wound care, however given her recent debilitating weakness she has missed her last appointments.  ROS is negative for fever, chills, productive cough, n/v, GI disturbances or dysuria. The patient's daughter who lived with her has just been diagnosed with PNA    In the ED VS: T 97.7 BP: 111-120/68-69 HR:69-75 RR:17-18 Satting 94-96 RA  ED administrations: Morphine 4 mg IV x1

## 2017-03-09 NOTE — H&P ADULT - PROBLEM SELECTOR PLAN 4
Pt with Hx of HF s/p MI. EF is unknown. Pt has an AICD in place  -Pt on home Losartan, Lasix, ASA and Metoprolol  -Will hold Lasix for now as patient dehydrated  -Will resume Metoprolol Succ  however dose needs further clarification as patient reports taking it twice a day  -Pravastatin held since chemotherapy 2/2 severe leg cramps

## 2017-03-09 NOTE — PHYSICAL THERAPY INITIAL EVALUATION ADULT - PERTINENT HX OF CURRENT PROBLEM, REHAB EVAL
67 Yo F with PMhx as mentioned above presenting with weakness and decrease PO intake in setting of Breast Malignancy.

## 2017-03-09 NOTE — H&P ADULT - PROBLEM SELECTOR PLAN 3
Pt with History of Diabetes. Reports using 60 units of Lantus at bedtime and 18-25 of Premeal.  -BG on BMP at 86. PT's TDD based on weight should be 40  -Given Lantus dose incredibly high with pt's low BG on bmp, will dose for 20 of lantus and 8 of premeal as well as sliding scale  -Modify regimen based on FS  -A1c in the am   -Lipid Profile

## 2017-03-09 NOTE — H&P ADULT - PROBLEM SELECTOR PLAN 6
Holding home Losartan and Lasix  -Resume when BP allows  -Pt's BP sensitive to Morphine. Holding home Losartan, Norvasc and Lasix  -Resume when BP allows  -Pt's BP sensitive to Morphine.

## 2017-03-09 NOTE — CONSULT NOTE ADULT - SUBJECTIVE AND OBJECTIVE BOX
Patient is a 66y old  Female who presents with a chief complaint of Generalized weakness, fall and increased drainage from under left breast. (09 Mar 2017 01:57)      HPI:  Pt is a 65 yo F with Pmhx of HTN, HLD, DM2, CKD Stage 3, HF s/p AICD placement, previous MI,  Left Breast Ca s/p Lumpectomy Radiation and Chemotherapy previously in Remission with reoccurrence in 2016 with Bone mets who presents to the ED for weakness, poor PO intake and recent fall. Per daughter at the bedside her mother had been doing relatively ok and tolerating her chemo session well. However since Her last session on Friday she has noted her mother to be weaker. Yesterday as she was standing she fell to the floor, stating that her legs gave out. The fall was witnessed by her daughter and the pt did not hit her head. The patient has been in tremendous pain due to wounds under her leftt breast. She developed ''foul smelling discharge'' under her left breast which requires wound care, however given her recent debilitating weakness she has missed her last appointments.  ROS is negative for fever, chills, productive cough, n/v, GI disturbances or dysuria. The patient's daughter who lived with her has just been diagnosed with PNA    In the ED VS: T 97.7 BP: 111-120/68-69 HR:69-75 RR:17-18 Satting 94-96 RA  ED administrations: Morphine 4 mg IV x1 (09 Mar 2017 00:35)      PAST MEDICAL & SURGICAL HISTORY:  Neuropathy  Heart failure  AICD (automatic cardioverter/defibrillator) present  MI, old  Diabetes  HLD (hyperlipidemia)  HTN (hypertension)  Breast CA  Status post left breast lumpectomy  AICD (automatic cardioverter/defibrillator) present      MEDICATIONS  (STANDING):  enoxaparin Injectable 40milliGRAM(s) SubCutaneous daily  aspirin enteric coated 81milliGRAM(s) Oral daily  fentaNYL   Patch  25 MICROgram(s)/Hr 1Patch Transdermal every 72 hours  insulin lispro Injectable (HumaLOG) 8Unit(s) SubCutaneous three times a day before meals  metoprolol succinate ER 100milliGRAM(s) Oral daily  sodium chloride 0.9%. 1000milliLiter(s) IV Continuous <Continuous>  insulin lispro (HumaLOG) corrective regimen sliding scale  SubCutaneous three times a day before meals  polyethylene glycol 3350 17Gram(s) Oral daily  insulin glargine Injectable (LANTUS) 20Unit(s) SubCutaneous at bedtime  oxyCODONE IR 15milliGRAM(s) Oral once    MEDICATIONS  (PRN):  ondansetron    Tablet 4milliGRAM(s) Oral every 8 hours PRN Nausea and/or Vomiting      Social History: lives with daughter in an elevator accessible apartment building, no home care services    Functional Level Prior to Admission: reports ADL independent, walks without devices    FAMILY HISTORY:  No pertinent family history in first degree relatives      CBC Full  -  ( 09 Mar 2017 06:34 )  WBC Count : 4.1 K/uL  Hemoglobin : 8.2 g/dL  Hematocrit : 26.7 %  Platelet Count - Automated : 116 K/uL  Mean Cell Volume : 93.7 fL  Mean Cell Hemoglobin : 28.8 pg  Mean Cell Hemoglobin Concentration : 30.7 g/dL  Auto Neutrophil # : x  Auto Lymphocyte # : x  Auto Monocyte # : x  Auto Eosinophil # : x  Auto Basophil # : x  Auto Neutrophil % : 79.0 %  Auto Lymphocyte % : 3.0 %  Auto Monocyte % : 13.0 %  Auto Eosinophil % : 4.0 %  Auto Basophil % : x      09 Mar 2017 06:34    141    |  107    |  13     ----------------------------<  106    3.6     |  23     |  1.19     Ca    8.4        09 Mar 2017 06:34  Mg     1.9       09 Mar 2017 06:34    TPro  6.4    /  Alb  2.4    /  TBili  1.2    /  DBili  x      /  AST  22     /  ALT  17     /  AlkPhos  136    08 Mar 2017 21:03      Urinalysis Basic - ( 08 Mar 2017 22:41 )    Color: Yellow / Appearance: Clear / S.020 / pH: x  Gluc: x / Ketone: 15 mg/dL  / Bili: Moderate / Urobili: 1.0 E.U./dL   Blood: x / Protein: Trace mg/dL / Nitrite: NEGATIVE   Leuk Esterase: NEGATIVE / RBC: x / WBC < 5 /HPF   Sq Epi: x / Non Sq Epi: Many /HPF / Bacteria: Present /HPF      Radiology:          Vital Signs Last 24 Hrs  T(C): 36.6, Max: 36.6 ( @ 04:45)  T(F): 97.9, Max: 97.9 (03-09 @ 04:45)  HR: 74 (69 - 75)  BP: 112/71 (92/62 - 120/68)  BP(mean): 83 (83 - 83)  RR: 20 (17 - 20)  SpO2: 97% (94% - 97%)    REVIEW OF SYSTEMS:    CONSTITUTIONAL: fatigue, weakness  EYES: No eye pain, visual disturbances, or discharge  ENMT:  No difficulty hearing, tinnitus, vertigo; No sinus or throat pain  NECK: No pain or stiffness  BREASTS: No pain, masses, or nipple discharge  RESPIRATORY: No cough, wheezing, chills or hemoptysis; No shortness of breath  CARDIOVASCULAR: No chest pain, palpitations, dizziness, or leg swelling  GASTROINTESTINAL: No abdominal or epigastric pain. No nausea, vomiting, or hematemesis; No diarrhea or constipation. No melena or hematochezia.  GENITOURINARY: No dysuria, frequency, hematuria, or incontinence  NEUROLOGICAL: No headaches, memory loss, loss of strength, numbness, or tremors  SKIN: No itching, burning, rashes, or lesions   LYMPH NODES: No enlarged glands  ENDOCRINE: No heat or cold intolerance; No hair loss  MUSCULOSKELETAL: No joint pain or swelling; No muscle, back, or extremity pain  PSYCHIATRIC: No depression, anxiety, mood swings, or difficulty sleeping  HEME/LYMPH: No easy bruising, or bleeding gums  ALLERGY AND IMMUNOLOGIC: No hives or eczema      Physical Exam: obese AA woman lying in bed, c/o fatigue    HEENT: normocephalic/ atraumatic, anicteric    Neck: supple, negative JVD, negative carotid bruits,    Chest: CTA bilaterally, neg wheeze, rhonchi, rales, crackles, egophany    Cardiovascular: regular rate and rhythm, neg murmurs/rubs/gallops    Abdomen: soft, non tender, obese, normal bowel sounds, neg hepatosplenomegaly    Extremities: LUE lymphedema, 2+ LE edema, neg calf tenderness, neg Eric's    Neurologic Exam:    Alert and oriented to person, place, date/year, speech fluent w/o dysarthria, repetition intact, comprehension intact,     Cranial Nerves:     II:                       pupils equal, round and reactive to light, visual fields intact   III/ IV/VI:             extraocular movements intact, neg nystagmus, ptosis  V:                      facial sensation intact, V1-3 normal  VII:                     face symmetric, no droop, normal eye closure and smile  VIII:                    hearing intact to finger rub bilaterally  IX/ X:                  soft palate rise symmetrical  XI:                      head turning, shoulder shrug normal  XII:                     tongue midline    Motor Exam:    Bilateral UE:        5/5 /intrinsics                            5/5 biceps/triceps/wrist extensors-flexors/deltoid                            negative pronator drift      Bilateral LE:        4-/5 hip flexors/adductors/abductors                            4/5 quadriceps/hamstrings                            4/5 dorsiflexors/plantar flexors/invertors-evertors    Sensory: intact to LT/PP in all UE/LE dermatomes    DTR:       = biceps/     triceps/     brachioradialis                =  patella/   medial hamstring/    ankle                 neg clonus                 neg Babinski                 neg Hoffmans      Gait:  NT        PM&R Impression:    1) s/p mechanical fall  2) deconditioned  3) gait dysfunction    Recommendations:    1) Physical therapy focusing on therapeutic exercises, bed mobility/transfer out of bed evaluation, progressive ambulation with assistive devices.    2) Disposition Plan: pending functional progress

## 2017-03-09 NOTE — PATIENT PROFILE ADULT. - FALL HARM RISK
coagulation(Bleeding disorder R/T clinical cond/anti-coags)/bones(Osteoporosis,prev fx,steroid use,metastatic bone ca/other/Weakness

## 2017-03-09 NOTE — H&P ADULT - NSHPPHYSICALEXAM_GEN_ALL_CORE
.  VITAL SIGNS:  T(C): 36.5, Max: 36.5 (03-08 @ 21:20)  T(F): 97.7, Max: 97.7 (03-08 @ 21:20)  HR: 72 (69 - 75)  BP: 92/62 (92/62 - 120/68)  BP(mean): 83 (83 - 83)  RR: 18 (17 - 20)  SpO2: 96% (94% - 96%)  Wt(kg): --    PHYSICAL EXAM:    Constitutional: Pt is in moderate distress 2.2 pain.  Head: NC/AT  Eyes: PERRL, EOMI, anicteric sclera  ENT: Dry MM. Halitosis  Neck: supple; no JVD   Breast: Left breast smaller than right.  darkenning and hardening of the skin. Foul smelling skin changes under the left breast with rash. Right breast non tender significantly more enlarged than left  Respiratory: CTA B/L in the apeces. Fine crackles at the bases  Cardiac: +S1/S2; RRR; no M/R/G;AICD in place  Gastrointestinal: soft, NT/ND; no rebound or guarding; +BSx4  Back: Seborrheic Keratosis all over the back  Extremities: Extensively dry scaly skin in the LE. LLE more edemartous> RLE. Lymphedema in the LUE  Vascular: 2+ radial, femoral, DP/PT pulses B/L  Dermatologic: DRY skin and seborreic skin changes troughout with stuck on apperance  Neurologic: AAOx3; CNII-XII grossly intact; no focal deficits

## 2017-03-10 ENCOUNTER — MESSAGE (OUTPATIENT)
Age: 67
End: 2017-03-10

## 2017-03-10 ENCOUNTER — TRANSCRIPTION ENCOUNTER (OUTPATIENT)
Age: 67
End: 2017-03-10

## 2017-03-10 LAB
CULTURE RESULTS: NO GROWTH — SIGNIFICANT CHANGE UP
SPECIMEN SOURCE: SIGNIFICANT CHANGE UP

## 2017-03-10 RX ORDER — INSULIN GLARGINE 100 [IU]/ML
60 INJECTION, SOLUTION SUBCUTANEOUS
Qty: 0 | Refills: 0 | COMMUNITY

## 2017-03-10 RX ORDER — ENOXAPARIN SODIUM 100 MG/ML
40 INJECTION SUBCUTANEOUS
Qty: 0 | Refills: 0 | COMMUNITY
Start: 2017-03-10

## 2017-03-10 RX ORDER — INSULIN GLARGINE 100 [IU]/ML
15 INJECTION, SOLUTION SUBCUTANEOUS AT BEDTIME
Qty: 0 | Refills: 0 | Status: DISCONTINUED | OUTPATIENT
Start: 2017-03-10 | End: 2017-03-13

## 2017-03-10 RX ORDER — POLYETHYLENE GLYCOL 3350 17 G/17G
17 POWDER, FOR SOLUTION ORAL
Qty: 0 | Refills: 0 | COMMUNITY
Start: 2017-03-10

## 2017-03-10 RX ADMIN — ENOXAPARIN SODIUM 40 MILLIGRAM(S): 100 INJECTION SUBCUTANEOUS at 12:25

## 2017-03-10 RX ADMIN — Medication 8 UNIT(S): at 18:47

## 2017-03-10 RX ADMIN — Medication 2 MILLIGRAM(S): at 19:00

## 2017-03-10 RX ADMIN — Medication 8 UNIT(S): at 13:26

## 2017-03-10 RX ADMIN — Medication 100 MILLIGRAM(S): at 05:34

## 2017-03-10 RX ADMIN — OXYCODONE HYDROCHLORIDE 15 MILLIGRAM(S): 5 TABLET ORAL at 05:33

## 2017-03-10 RX ADMIN — OXYCODONE HYDROCHLORIDE 15 MILLIGRAM(S): 5 TABLET ORAL at 21:30

## 2017-03-10 RX ADMIN — SODIUM CHLORIDE 100 MILLILITER(S): 9 INJECTION INTRAMUSCULAR; INTRAVENOUS; SUBCUTANEOUS at 05:32

## 2017-03-10 RX ADMIN — INSULIN GLARGINE 15 UNIT(S): 100 INJECTION, SOLUTION SUBCUTANEOUS at 23:30

## 2017-03-10 RX ADMIN — OXYCODONE HYDROCHLORIDE 15 MILLIGRAM(S): 5 TABLET ORAL at 06:00

## 2017-03-10 RX ADMIN — Medication 8 UNIT(S): at 09:00

## 2017-03-10 RX ADMIN — Medication 50 MILLIGRAM(S): at 05:33

## 2017-03-10 RX ADMIN — OXYCODONE HYDROCHLORIDE 15 MILLIGRAM(S): 5 TABLET ORAL at 20:51

## 2017-03-10 RX ADMIN — OXYCODONE HYDROCHLORIDE 15 MILLIGRAM(S): 5 TABLET ORAL at 10:45

## 2017-03-10 RX ADMIN — Medication 2 MILLIGRAM(S): at 05:34

## 2017-03-10 RX ADMIN — Medication 50 MILLIGRAM(S): at 18:47

## 2017-03-10 RX ADMIN — Medication 81 MILLIGRAM(S): at 12:25

## 2017-03-10 RX ADMIN — OXYCODONE HYDROCHLORIDE 15 MILLIGRAM(S): 5 TABLET ORAL at 09:46

## 2017-03-10 NOTE — PROGRESS NOTE ADULT - SUBJECTIVE AND OBJECTIVE BOX
PGY 1 PROGRESS NOTE:    S:  O/N-DIANA.  THIS MORNING-Reports improved pain control but feeling weak overall. ROS negative for F/C, N/V, C/D, CP, SOB, palpitations, urinary sx.     O;  Vital Signs Last 24 Hrs  T(C): 36.1, Max: 37.1 (03-09 @ 21:17)  T(F): 97, Max: 98.7 (03-09 @ 21:17)  HR: 68 (66 - 68)  BP: 102/69 (27/80 - 103/50)  BP(mean): --  RR: 18 (18 - 18)  SpO2: 96% (94% - 98%)    PHYSICAL EXAM:  General-elderly  female, NAD  HEENT-dry oral mucosa w/ white colored discharge on tongue  Lungs-CTAB  Heart-RRR, normal S1/S2  Chest- R breast swollen vs L, L breast with significant skin changes, exquisitely TTP, minimal discharge from region under L breast   Abdomen-soft, NTND, bowel sounds present  Extremities-wwp, no edema  Neuro-A&O x3, responds to all commands, moves all extremities    LABS: none    MEDICATIONS  (STANDING):  enoxaparin Injectable 40milliGRAM(s) SubCutaneous daily  aspirin enteric coated 81milliGRAM(s) Oral daily  fentaNYL   Patch  25 MICROgram(s)/Hr 1Patch Transdermal every 72 hours  insulin lispro Injectable (HumaLOG) 8Unit(s) SubCutaneous three times a day before meals  metoprolol succinate ER 100milliGRAM(s) Oral daily  insulin lispro (HumaLOG) corrective regimen sliding scale  SubCutaneous Before meals and at bedtime  polyethylene glycol 3350 17Gram(s) Oral daily  insulin glargine Injectable (LANTUS) 20Unit(s) SubCutaneous at bedtime  pregabalin 50milliGRAM(s) Oral every 12 hours  diazepam    Tablet 2milliGRAM(s) Oral every 12 hours    MEDICATIONS  (PRN):  ondansetron    Tablet 4milliGRAM(s) Oral every 8 hours PRN Nausea and/or Vomiting  oxyCODONE IR 15milliGRAM(s) Oral every 4 hours PRN Severe Pain (7 - 10) PGY 1 PROGRESS NOTE:    S:  O/N-DIANA.  THIS MORNING-Reports improved pain control but feeling weak overall. ROS negative for F/C, N/V, C/D, CP, SOB, palpitations, urinary sx.     O;  Vital Signs Last 24 Hrs  T(C): 36.1, Max: 37.1 (03-09 @ 21:17)  T(F): 97, Max: 98.7 (03-09 @ 21:17)  HR: 68 (66 - 68)  BP: 102/69   BP(mean): --  RR: 18 (18 - 18)  SpO2: 96% (94% - 98%)    PHYSICAL EXAM:  General-elderly  female, NAD  HEENT-dry oral mucosa w/ white colored discharge on tongue  Lungs-CTAB  Heart-RRR, normal S1/S2  Chest- R breast swollen vs L, L breast with significant skin changes, exquisitely TTP, minimal discharge from region under L breast   Abdomen-soft, NTND, bowel sounds present  Extremities-wwp, no edema  Neuro-A&O x3, responds to all commands, moves all extremities    LABS: none    MEDICATIONS  (STANDING):  enoxaparin Injectable 40milliGRAM(s) SubCutaneous daily  aspirin enteric coated 81milliGRAM(s) Oral daily  fentaNYL   Patch  25 MICROgram(s)/Hr 1Patch Transdermal every 72 hours  insulin lispro Injectable (HumaLOG) 8Unit(s) SubCutaneous three times a day before meals  metoprolol succinate ER 100milliGRAM(s) Oral daily  insulin lispro (HumaLOG) corrective regimen sliding scale  SubCutaneous Before meals and at bedtime  polyethylene glycol 3350 17Gram(s) Oral daily  insulin glargine Injectable (LANTUS) 20Unit(s) SubCutaneous at bedtime  pregabalin 50milliGRAM(s) Oral every 12 hours  diazepam    Tablet 2milliGRAM(s) Oral every 12 hours    MEDICATIONS  (PRN):  ondansetron    Tablet 4milliGRAM(s) Oral every 8 hours PRN Nausea and/or Vomiting  oxyCODONE IR 15milliGRAM(s) Oral every 4 hours PRN Severe Pain (7 - 10) PGY 1 PROGRESS NOTE:    S:  O/N-DIANA.  THIS MORNING-Reports improved pain control but feeling weak overall. ROS negative for F/C, N/V, C/D, CP, SOB, palpitations, urinary sx.     O;  Vital Signs Last 24 Hrs  T(C): 36.1, Max: 37.1 (03-09 @ 21:17)  T(F): 97, Max: 98.7 (03-09 @ 21:17)  HR: 68 (66 - 68)  BP: 102/69   BP(mean): --  RR: 18 (18 - 18)  SpO2: 96% (94% - 98%)    PHYSICAL EXAM:  General-elderly  female, NAD  HEENT-dry oral mucosa w/ white colored discharge on tongue  Lungs-CTAB  Heart-RRR, normal S1/S2  Chest- R breast swollen vs L, L breast with significant skin changes, exquisitely TTP, minimal discharge from region under L breast   Abdomen-soft, NTND, bowel sounds present  [id number], no edema. RUE-wwp, no edema. LUE-significant pitting lymphedema extending to mid arm  Neuro-A&O x3, responds to all commands, moves all extremities    LABS: none    MEDICATIONS  (STANDING):  enoxaparin Injectable 40milliGRAM(s) SubCutaneous daily  aspirin enteric coated 81milliGRAM(s) Oral daily  fentaNYL   Patch  25 MICROgram(s)/Hr 1Patch Transdermal every 72 hours  insulin lispro Injectable (HumaLOG) 8Unit(s) SubCutaneous three times a day before meals  metoprolol succinate ER 100milliGRAM(s) Oral daily  insulin lispro (HumaLOG) corrective regimen sliding scale  SubCutaneous Before meals and at bedtime  polyethylene glycol 3350 17Gram(s) Oral daily  insulin glargine Injectable (LANTUS) 20Unit(s) SubCutaneous at bedtime  pregabalin 50milliGRAM(s) Oral every 12 hours  diazepam    Tablet 2milliGRAM(s) Oral every 12 hours    MEDICATIONS  (PRN):  ondansetron    Tablet 4milliGRAM(s) Oral every 8 hours PRN Nausea and/or Vomiting  oxyCODONE IR 15milliGRAM(s) Oral every 4 hours PRN Severe Pain (7 - 10)

## 2017-03-10 NOTE — DISCHARGE NOTE ADULT - SECONDARY DIAGNOSIS.
Metastatic breast cancer CKD (chronic kidney disease) stage 3, GFR 30-59 ml/min Diabetes HTN (hypertension) Nutrition, metabolism, and development symptoms Need for prophylactic measure

## 2017-03-10 NOTE — PROGRESS NOTE ADULT - PROBLEM SELECTOR PLAN 4
Pt with Hx of HF s/p MI. EF is unknown. Pt has an AICD in place  -Pt on home Losartan, Lasix, ASA and Metoprolol  -Will hold Lasix for now as patient dehydrated  -Will resume Metoprolol Succ  however dose needs further clarification as patient reports taking it twice a day  -Pravastatin held since chemotherapy 2/2 severe leg cramps -patient w/ known h/o HF s/p MI (EF unknown), s/p AICD placement  -c/w home metoprolol succinate 100 mg BID  -holding home losartan, lasix in setting of normotension  -holding home pravastatin in setting of significant leg cramping   -c/w home ASA

## 2017-03-10 NOTE — DISCHARGE NOTE ADULT - MEDICATION SUMMARY - MEDICATIONS TO TAKE
I will START or STAY ON the medications listed below when I get home from the hospital:    aspirin 81 mg oral tablet  -- 1 tab(s) by mouth once a day  -- Indication: For Need for prophylactic measure    fentaNYL 25 mcg/hr transdermal film, extended release  -- 1 patch by transdermal patch every 72 hours  -- Indication: For Metastatic breast cancer    acetaminophen 325 mg oral tablet  -- 2 tab(s) by mouth once a day, As Needed  -- Indication: For Metastatic breast cancer    oxyCODONE 5 mg oral capsule  -- 2 tab(s) by mouth every 4 hours, As Needed  -- Indication: For Metastatic breast cancer    losartan 50 mg oral tablet  -- 1 tab(s) by mouth once a day  -- Indication: For HTN (hypertension)    diazePAM 2 mg oral tablet  -- 1 tab(s) by mouth 2 times a day, As Needed  -- Indication: For Metastatic breast cancer    Lyrica 50 mg oral capsule  -- 1 cap(s) by mouth every 12 hours  -- Indication: For Metastatic breast cancer    Lantus 100 units/mL subcutaneous solution  -- 20 unit(s) subcutaneous once a day  -- Indication: For Diabetes    HumaLOG 100 units/mL subcutaneous solution  -- 8 unit(s) subcutaneous 3 times a day  -- Indication: For Diabetes    Zofran 4 mg oral tablet  -- 1 tab(s) by mouth every 8 hours, As Needed  -- Indication: For Nausea    Metoprolol Succinate  mg oral tablet, extended release  -- 1 tab(s) by mouth once a day  -- Indication: For Heart failure    Norvasc 10 mg oral tablet  -- 1 tab(s) by mouth once a day  -- Indication: For HTN (hypertension)    Lasix 40 mg oral tablet  -- 1 tab(s) by mouth once a day  -- Indication: For Heart failure    polyethylene glycol 3350 oral powder for reconstitution  -- 17 gram(s) by mouth once a day  -- Indication: For Need for prophylactic measure I will START or STAY ON the medications listed below when I get home from the hospital:    aspirin 81 mg oral tablet  -- 1 tab(s) by mouth once a day  -- Indication: For Need for prophylactic measure    fentaNYL 25 mcg/hr transdermal film, extended release  -- 1 patch by transdermal patch every 72 hours  -- Indication: For Metastatic breast cancer    acetaminophen 325 mg oral tablet  -- 2 tab(s) by mouth once a day, As Needed  -- Indication: For Metastatic breast cancer    oxyCODONE 5 mg oral capsule  -- 2 tab(s) by mouth every 4 hours, As Needed  -- Indication: For Metastatic breast cancer    diazePAM 2 mg oral tablet  -- 1 tab(s) by mouth 2 times a day, As Needed  -- Indication: For Metastatic breast cancer    pregabalin 50 mg oral capsule  -- 1 cap(s) by mouth every 8 hours  -- Indication: For Pain    insulin glargine  -- 15 unit(s) subcutaneous once a day (at bedtime)  -- Indication: For Diabetes    HumaLOG 100 units/mL subcutaneous solution  -- 8 unit(s) subcutaneous 3 times a day  -- Indication: For Diabetes    Zofran 4 mg oral tablet  -- 1 tab(s) by mouth every 8 hours, As Needed  -- Indication: For Nausea    Metoprolol Succinate  mg oral tablet, extended release  -- 1 tab(s) by mouth once a day  -- Indication: For Heart failure    polyethylene glycol 3350 oral powder for reconstitution  -- 17 gram(s) by mouth once a day  -- Indication: For Need for prophylactic measure I will START or STAY ON the medications listed below when I get home from the hospital:    aspirin 81 mg oral tablet  -- 1 tab(s) by mouth once a day  -- Indication: For Need for prophylactic measure    fentaNYL 25 mcg/hr transdermal film, extended release  -- 1 patch by transdermal patch every 72 hours  -- Indication: For Metastatic breast cancer    acetaminophen 325 mg oral tablet  -- 2 tab(s) by mouth once a day, As Needed  -- Indication: For Metastatic breast cancer    oxyCODONE 5 mg oral capsule  -- 2 tab(s) by mouth every 4 hours, As Needed  -- Indication: For Metastatic breast cancer    diazePAM 2 mg oral tablet  -- 1 tab(s) by mouth 2 times a day, As Needed  -- Indication: For Metastatic breast cancer    pregabalin 50 mg oral capsule  -- 1 cap(s) by mouth every 8 hours MDD:3 tabs  -- Indication: For Metastatic breast cancer    insulin glargine  -- 15 unit(s) subcutaneous once a day (at bedtime)  -- Indication: For Diabetes    HumaLOG 100 units/mL subcutaneous solution  -- 8 unit(s) subcutaneous 3 times a day  -- Indication: For Diabetes    Zofran 4 mg oral tablet  -- 1 tab(s) by mouth every 8 hours, As Needed  -- Indication: For Nausea    Metoprolol Succinate  mg oral tablet, extended release  -- 1 tab(s) by mouth once a day  -- Indication: For Heart failure    polyethylene glycol 3350 oral powder for reconstitution  -- 17 gram(s) by mouth once a day  -- Indication: For Constipation

## 2017-03-10 NOTE — PROGRESS NOTE ADULT - PROBLEM SELECTOR PLAN 1
Pt presenting with worsening weakness, poor PO intake and recent mechanical fall. Pt's weakness likely multifactorial: Metastatic breast cancer, immunosuppression from chemotherapy and deconditioning  -Pt looks significantly dry on physical exam, Will start on Maintaince fluid.  -TSH added pending for further eval of weakness  -Pt consult in the am -on admission, patient reported recent h/o weakness, poor PO intake and recent mechanical fall. TSH WNL.   -etiology likely 2/2 deconditioning vs underlying malignancy vs chemotherapy side effects   -c/w supportive care, encourage PO   -per PT, plan to dc to [facility]

## 2017-03-10 NOTE — PROGRESS NOTE ADULT - SUBJECTIVE AND OBJECTIVE BOX
Physical Medicine and Rehabilitation Progress Note:    Patient is a 66y old  Female who presents with a chief complaint of Generalized weakness, fall and increased drainage from under left breast. (10 Mar 2017 13:45)      HPI:  Pt is a 65 yo F with Pmhx of HTN, HLD, DM2, CKD Stage 3, HF s/p AICD placement, previous MI,  Left Breast Ca s/p Lumpectomy Radiation and Chemotherapy previously in Remission with reoccurrence in November 2016 with Bone mets who presents to the ED for weakness, poor PO intake and recent fall. Per daughter at the bedside her mother had been doing relatively ok and tolerating her chemo session well. However since Her last session on Friday she has noted her mother to be weaker. Yesterday as she was standing she fell to the floor, stating that her legs gave out. The fall was witnessed by her daughter and the pt did not hit her head. The patient has been in tremendous pain due to wounds under her leftt breast. She developed ''foul smelling discharge'' under her left breast which requires wound care, however given her recent debilitating weakness she has missed her last appointments.  ROS is negative for fever, chills, productive cough, n/v, GI disturbances or dysuria. The patient's daughter who lived with her has just been diagnosed with PNA    In the ED VS: T 97.7 BP: 111-120/68-69 HR:69-75 RR:17-18 Satting 94-96 RA  ED administrations: Morphine 4 mg IV x1 (09 Mar 2017 00:35)                            8.2    4.1   )-----------( 116      ( 09 Mar 2017 06:34 )             26.7       09 Mar 2017 06:34    141    |  107    |  13     ----------------------------<  106    3.6     |  23     |  1.19     Ca    8.4        09 Mar 2017 06:34  Mg     1.9       09 Mar 2017 06:34    TPro  6.4    /  Alb  2.4    /  TBili  1.2    /  DBili  x      /  AST  22     /  ALT  17     /  AlkPhos  136    08 Mar 2017 21:03    Vital Signs Last 24 Hrs  T(C): 36.1, Max: 37.1 (03-09 @ 21:17)  T(F): 97, Max: 98.7 (03-09 @ 21:17)  HR: 68 (66 - 68)  BP: 102/69 (27/80 - 103/50)  BP(mean): --  RR: 18 (18 - 18)  SpO2: 96% (94% - 98%)    MEDICATIONS  (STANDING):  enoxaparin Injectable 40milliGRAM(s) SubCutaneous daily  aspirin enteric coated 81milliGRAM(s) Oral daily  fentaNYL   Patch  25 MICROgram(s)/Hr 1Patch Transdermal every 72 hours  insulin lispro Injectable (HumaLOG) 8Unit(s) SubCutaneous three times a day before meals  metoprolol succinate ER 100milliGRAM(s) Oral daily  insulin lispro (HumaLOG) corrective regimen sliding scale  SubCutaneous Before meals and at bedtime  polyethylene glycol 3350 17Gram(s) Oral daily  pregabalin 50milliGRAM(s) Oral every 12 hours  diazepam    Tablet 2milliGRAM(s) Oral every 12 hours  insulin glargine Injectable (LANTUS) 15Unit(s) SubCutaneous at bedtime    MEDICATIONS  (PRN):  ondansetron    Tablet 4milliGRAM(s) Oral every 8 hours PRN Nausea and/or Vomiting  oxyCODONE IR 15milliGRAM(s) Oral every 4 hours PRN Severe Pain (7 - 10)    Currently Undergoing Physical Therapy at bedside.    Initial Functional Status Assessment:    Previous Level of Function:   · Ambulation Skills	needs device	  · Transfer Skills	needs device	  · ADL Skills	needs device	  · Work/Leisure Activity	needs device	  · Additional Comments	Pt reports prior to hospitalization she was ambulating independently using a straight cane. No HHA. Lived with daughter who worked during the day however would assist in taking her to appointments.	    Cognitive Status Examination:   · Orientation	oriented to person, place, time and situation	  · Level of Consciousness	alert; groggy	  · Follows Commands and Answers Questions	100% of the time	  · Personal Safety and Judgment	impaired	    Range of Motion Exam:   · Active Range of Motion Examination	bilateral  lower extremity Active ROM was WFL (within functional limits); bilateral UE AROM dec: RUE AROM 90 degrees, LUE AROM 45 degrees	    Manual Muscle Testing:   · Manual Muscle Testing Results	grossly assessed due to, functional mobility testing; >/=3/5 b/l UE and LE	  Bed Mobility: Rolling/Turning:   · Level of [place]	supervision	  · Assistive Device	bed rails	    Bed Mobility: Sit to Supine:   · Level of [place]	contact guard	  · Physical Assist/Nonphysical Assist	verbal cues	  · Assistive Device	bed rails	    Bed Mobility: Supine to Sit:   · Level of [place]	contact guard	  · Physical Assist/Nonphysical Assist	verbal cues	    Bed Mobility Analysis:   · Bed Mobility Limitations	decreased ability to use arms for pushing/pulling	  · Impairments Contributing to Impaired Bed Mobility	decreased strength; decreased ROM; endurance decreased	    Transfer: Sit to Stand:   · Level of [place]	moderate assist (50% patients effort)	  · Physical Assist/Nonphysical Assist	verbal cues	  · Weight-Bearing Restrictions	full weight-bearing	  · Assistive Device	rolling walker	    Transfer: Stand to Sit:   · Level of [place]	supervision	  · Weight-Bearing Restrictions	full weight-bearing	  · Assistive Device	rolling walker	    Sit/Stand Transfer Safety Analysis:   · Transfer Safety Concerns Noted	decreased sequencing ability; decreased step length	  · Impairments Contributing to Impaired Transfers	decreased ROM; decreased strength; endurance decreased	    Gait Skills:   · Level of [place]	contact guard	  · Physical Assist/Nonphysical Assist	verbal cues	  · Weight-Bearing Restrictions	full weight-bearing	  · Assistive Device	rolling walker	  · Gait Distance	3 side steps to L	  Gait Analysis:   · Gait Pattern Used	side-stepping	  · Gait Deviations Noted	decreased step length	  · Impairments Contributing to Gait Deviations	impaired balance; decreased strength; decreased ROM; endurance decreased	    Balance Skills Assessment:   · Sitting Balance: Static	good balance	  · Sitting Balance: Dynamic	fair balance	  · Sit-to-Stand Balance	fair balance	  · Standing Balance: Static	fair balance	  · Standing Balance: Dynamic	fair balance	    Clinical Impressions:   · Criteria for Skilled Therapeutic Interventions	impairments found; functional limitations in following categories; rehab potential	  · Impairments Found (describe specific impairments)	aerobic capacity/endurance; muscle strength; gait, locomotion, and balance	  · Rehab Potential	good, to achieve stated therapy goals	  · Therapy Frequency	2-3x/week	    PM&R Impression: as above    Disposition Plan Recommendations: pending functional progress

## 2017-03-10 NOTE — PROGRESS NOTE ADULT - SUBJECTIVE AND OBJECTIVE BOX
Pt. seen at 1013  Pain Management Progress Note - [facility] [phone]    HPI:  Pt. states she has pain in the left breast and under the left breast.  Describes it as  burning.  Pt. states oxycodone is helpful and states she feels "sleepy".              Pertinent PMH: Pain at: ___Back ___Neck___Knee ___Hip ___Shoulder _x__ Opioid tolerance    Pain is ___ sharp ____dull __x_burning ___achy ___ Intensity: ____ mild ____mod ____severe     Location _____surgical site _____cervical _____lumbar ____abd _____upper ext____lower ext Left breast    Worse with __x__activity __x__movement _____physical therapy___ Rest    Improved with __x__medication __x__rest ____physical therapy      enoxaparin Injectable  aspirin enteric coated  fentaNYL   Patch  25 MICROgram(s)/Hr  insulin lispro Injectable (HumaLOG)  ondansetron    Tablet  metoprolol succinate ER  insulin lispro (HumaLOG) corrective regimen sliding scale  polyethylene glycol 3350  oxyCODONE IR  pregabalin  diazepam    Tablet  insulin glargine Injectable (LANTUS)      ROS: Const:  ___febrile   Eyes:___ENT:___CV: _-__chest pain  Resp: __-__sob  GI:_-__nausea __-_vomiting __+__abd pain ___npo ___clears _+__full diet __bm  :___ Musk: ___pain ___spasm  Skin:___ Neuro:  _-__ubltogwb__-_wevneeydx____ numbness ___weakness ___paresthesia  Psych:___anxiety  Endo:___ Heme:___Allergy:___          Hemoglobin: 8.2 g/dL (03-09 @ 06:34)  Hemoglobin: 9.1 g/dL (03-08 @ 21:03)        T(C): 36.1, Max: 37.1 (03-09 @ 21:17)  HR: 68 (66 - 68)  BP: 102/69 (27/80 - 103/50)  RR: 18 (18 - 18)  SpO2: 96% (94% - 98%)  Wt(kg): --     PHYSICAL EXAM:  Gen Appearance: _x__no acute distress _x__appropriate         Neuro: _x__SILT feet___x_ EOM Intact Psych: AAOX3__, _x__mood/affect appropriate        Eyes: _x__conjunctiva WNL  __x___ Pupils equal and round        ENT: __x_ears and nose atraumatic_x__ Hearing grossly intact        Neck: ___trachea midline, no visible masses ___thyroid without palpable mass    Resp: _x__Nml WOB____No tactile fremitus ___clear to auscultation    Cardio: ___extremities free from edema ____pedal pulses palpable    GI/Abdomen: _x__soft __x___ Nontender______Nondistended_____HSM    Lymphatic: ___no palpable nodes in neck  ___no palpable nodes calves and feet    Skin/Wound: ___Incision, ___Dressing c/d/i,   ____surrounding tissues soft,  ___drain/chest tube present____    Muscular: EHL _5__/5  Gastrocnemius___/5    __x_absent clubbing/cyanosis         ASSESSMENT:  This is a 66y old Female with a history of:  FAILURE TO THRIVE IN ADULT  Neuropathy  Heart failure  AICD (automatic cardioverter/defibrillator) present  MI, old  Diabetes  HLD (hyperlipidemia)  HTN (hypertension)  Breast CA  Failure to thrive in adult  CKD (chronic kidney disease) stage 3, GFR 30-59 ml/min  Heart failure  Diabetes  Metastatic breast cancer  Need for prophylactic measure  Nutrition, metabolism, and development symptoms  Weakness  Status post left breast lumpectomy  AICD (automatic cardioverter/defibrillator) present  WEAKNESS  breast cancer and pain in the left breast         Recommended Treatment PLAN: Pt. seen at 1013  Pain Management Progress Note - [facility] [phone]    HPI:  Pt. states she has pain in the left breast and under the left breast.  Describes it as  burning.  Pt. states oxycodone is helpful and states she feels "sleepy".              Pertinent PMH: Pain at: ___Back ___Neck___Knee ___Hip ___Shoulder _x__ Opioid tolerance    Pain is ___ sharp ____dull __x_burning ___achy ___ Intensity: ____ mild ____mod ____severe     Location _____surgical site _____cervical _____lumbar ____abd _____upper ext____lower ext Left breast    Worse with __x__activity __x__movement _____physical therapy___ Rest    Improved with __x__medication __x__rest ____physical therapy      enoxaparin Injectable  aspirin enteric coated  fentaNYL   Patch  25 MICROgram(s)/Hr  insulin lispro Injectable (HumaLOG)  ondansetron    Tablet  metoprolol succinate ER  insulin lispro (HumaLOG) corrective regimen sliding scale  polyethylene glycol 3350  oxyCODONE IR  pregabalin  diazepam    Tablet  insulin glargine Injectable (LANTUS)      ROS: Const:  ___febrile   Eyes:___ENT:___CV: _-__chest pain  Resp: __-__sob  GI:_-__nausea __-_vomiting __+__abd pain ___npo ___clears _+__full diet __bm  :___ Musk: ___pain ___spasm  Skin:___ Neuro:  _-__ufjkhkki__-_ufmmtzwpp____ numbness ___weakness ___paresthesia  Psych:___anxiety  Endo:___ Heme:___Allergy:___          Hemoglobin: 8.2 g/dL (03-09 @ 06:34)  Hemoglobin: 9.1 g/dL (03-08 @ 21:03)        T(C): 36.1, Max: 37.1 (03-09 @ 21:17)  HR: 68 (66 - 68)  BP: 102/69 (27/80 - 103/50)  RR: 18 (18 - 18)  SpO2: 96% (94% - 98%)  Wt(kg): --     PHYSICAL EXAM:  Gen Appearance: _x__no acute distress _x__appropriate         Neuro: _x__SILT feet___x_ EOM Intact Psych: AAOX3__, _x__mood/affect appropriate        Eyes: _x__conjunctiva WNL  __x___ Pupils equal and round        ENT: __x_ears and nose atraumatic_x__ Hearing grossly intact        Neck: ___trachea midline, no visible masses ___thyroid without palpable mass    Resp: _x__Nml WOB____No tactile fremitus ___clear to auscultation    Cardio: ___extremities free from edema ____pedal pulses palpable    GI/Abdomen: _x__soft __x___ Nontender______Nondistended_____HSM    Lymphatic: ___no palpable nodes in neck  ___no palpable nodes calves and feet    Skin/Wound: ___Incision, ___Dressing c/d/i,   ____surrounding tissues soft,  ___drain/chest tube present____    Muscular: EHL _5__/5  Gastrocnemius___/5    __x_absent clubbing/cyanosis         ASSESSMENT:  This is a 66y old Female with a history of:  FAILURE TO THRIVE IN ADULT  Neuropathy  Heart failure  AICD (automatic cardioverter/defibrillator) present  MI, old  Diabetes  HLD (hyperlipidemia)  HTN (hypertension)  Breast CA  Failure to thrive in adult  CKD (chronic kidney disease) stage 3, GFR 30-59 ml/min  Heart failure  Diabetes  Metastatic breast cancer  Need for prophylactic measure  Nutrition, metabolism, and development symptoms  Weakness  Status post left breast lumpectomy  AICD (automatic cardioverter/defibrillator) present  WEAKNESS  breast cancer and pain in the left breast  I spoke with the patient's daughter Popeye who states the patient has been on a 50 mcg/hr Fentanyl patch in the past and it made her lethargic and confused.  States she feels the oxycodone is helpful.          Recommended Treatment PLAN:  1.  Suggest increasing lyrica to 50 mg po TID  2.  Suggest continue fentanyl patch 25 mcg/hr q72h  3.  Suggest continue oxycodone 15 mg po q4h prn  4.  Suggest valium 2 mg po q12h PRN  5.  Suggest dilaudid 0.5 mg IV qd prn before dressing changes.

## 2017-03-10 NOTE — PROGRESS NOTE ADULT - PROBLEM SELECTOR PLAN 2
Pt with Metastatic left breast cancer to the Bone.  -Left breast significantly hardened with darkening of the skin. Breast is almost immobile. There are skin wounds under the left breast tissue with strong Malodorous discharge. Pt also with significant lymphedema of her LUE as well as tenderness  -Wound care in the morning  -Cont with Morphine and Percocet PRN for pain  -Contact outpatient Oncologist in the am for further collateral. Dr Zhu has not seen the patient in clinic in over a year  -Pt would like to remain full code. -patient w/ h/o left breast cancer w/ mets to bone.   -Left breast significantly hardened with darkening of the skin. Breast is almost immobile. There are skin wounds under the left breast tissue with strong Malodorous discharge. Pt also with significant lymphedema of her LUE as well as tenderness  -Wound care in the morning  -Cont with Morphine and Percocet PRN for pain  -Contact outpatient Oncologist in the am for further collateral. Dr Zhu has not seen the patient in clinic in over a year  -Pt would like to remain full code. -patient w/ h/o recurrent (as of 11/2016) left breast cancer w/ mets to bone. Patient f/u w/ onc at [facility] [place]. Has been on RT in the past, currently on chemotherapy w/ gemcitabine, last dose 2/26, missed dose 3/6 2/2 daughter being sick. Exam significant for R breast swelling w/ L breast skin thickening and hardening w/ decreased mobility and exquisitely TTP. Skin breakdown under L breast w/ some non purulent discharge  -wound care on board, appreciate recs  -pain mgmt on board, appreciate recs: c/w home pain regimen- oxyocodone 15 mg q4hrs, diazepam 2 mg PO BID , lyrica 50 mg BID, fentanyl 25 mcg patch q72 hrs  -will consider palliative consult for additional support

## 2017-03-10 NOTE — DISCHARGE NOTE ADULT - ADDITIONAL INSTRUCTIONS
Please take all of your medications as directed and follow up with your PMD and oncologist within 1 week of discharge

## 2017-03-10 NOTE — PROGRESS NOTE ADULT - SUBJECTIVE AND OBJECTIVE BOX
CC: FAILURE TO THRIVE IN ADULT      INTERVAL HISTORY:feels somewhat better  wants to get out of bed and work with PT      ROS: No chest pain, no sob, no abd pain. No n/v/d    PAST MEDICAL & SURGICAL HISTORY:  Neuropathy  Heart failure  AICD (automatic cardioverter/defibrillator) present  MI, old  Diabetes  HLD (hyperlipidemia)  HTN (hypertension)  Breast CA  Status post left breast lumpectomy  AICD (automatic cardioverter/defibrillator) present      PHYSICAL EXAM:  T(C): 36.1, Max: 37.1 ( @ 21:17)  HR: 68  BP: 102/69 (27/80 - 103/50)  RR: 18  SpO2: 96%  Wt(kg): --  I&O's Summary    I & Os for current day (as of 10 Mar 2017 09:59)  =============================================  IN: 1200 ml / OUT: 0 ml / NET: 1200 ml    Weight 81 ( @ 00:30)  General: AAO x 3,  NAD.  +AICD in place  HEENT: moist mucous membranes, no pallor/cyanosis.  Neck: no JVD visible.  Cardiac: S1, S2. RRR. No murmurs   Respratory: CTA b/l, no access muscle use.   Abdomen: soft. nontender. nondistended  Skin:  Left breast smaller than right.  darkenning and hardening of the skin. Foul smelling skin changes under the left breast with rash. Right breast non tender significantly more enlarged than left  seborrheic keratosis  Extremities:+ LUE lymphedema    DATA:                        8.2<L>  4.1   )-----------( 116<L>    ( 09 Mar 2017 06:34 )             26.7<L>    Ferritin, Serum: 388.3 ng/mL <H> ( @ 06:34)   Iron Total, Serum: 44 ug/dL <L> ( @ 06:34)     141    |  107    |  13     ----------------------------<  106<H>  Ca:8.4<L> (09 Mar 2017 06:34)  3.6     |  23     |  1.19         TPro  6.4 g/dL  /  Alb  2.4 g/dL<L>  /  TBili  1.2 mg/dL  /  DBili  x      /  AST  22 U/L  /  ALT  17 U/L  /  AlkPhos  136 U/L<H>  08 Mar 2017 21:03        Urinalysis Basic - ( 08 Mar 2017 22:41 )  Color: Yellow / Appearance: Clear / S.020 / pH: x  Gluc: x / Ketone: 15 mg/dL<!!>  / Bili: Moderate<!!> / Urobili: 1.0 E.U./dL   Blood: x / Protein: Trace mg/dL<!!> / Nitrite: NEGATIVE   Leuk Esterase: NEGATIVE / RBC: x / WBC < 5 /HPF   Sq Epi: x / Non Sq Epi: Many /HPF<!!> / Bacteria: Present /HPF<!!>                MEDICATIONS  (STANDING):  enoxaparin Injectable 40milliGRAM(s) SubCutaneous daily  aspirin enteric coated 81milliGRAM(s) Oral daily  fentaNYL   Patch  25 MICROgram(s)/Hr 1Patch Transdermal every 72 hours  insulin lispro Injectable (HumaLOG) 8Unit(s) SubCutaneous three times a day before meals  metoprolol succinate ER 100milliGRAM(s) Oral daily  sodium chloride 0.9%. 1000milliLiter(s) IV Continuous <Continuous>  insulin lispro (HumaLOG) corrective regimen sliding scale  SubCutaneous Before meals and at bedtime  polyethylene glycol 3350 17Gram(s) Oral daily  insulin glargine Injectable (LANTUS) 20Unit(s) SubCutaneous at bedtime  pregabalin 50milliGRAM(s) Oral every 12 hours  diazepam    Tablet 2milliGRAM(s) Oral every 12 hours    MEDICATIONS  (PRN):  ondansetron    Tablet 4milliGRAM(s) Oral every 8 hours PRN Nausea and/or Vomiting  oxyCODONE IR 15milliGRAM(s) Oral every 4 hours PRN Severe Pain (7 - 10)

## 2017-03-10 NOTE — DISCHARGE NOTE ADULT - MEDICATION SUMMARY - MEDICATIONS TO CHANGE
I will SWITCH the dose or number of times a day I take the medications listed below when I get home from the hospital:    Lantus 100 units/mL subcutaneous solution  -- 60 unit(s) subcutaneous once a day

## 2017-03-10 NOTE — PROGRESS NOTE ADULT - PROBLEM SELECTOR PLAN 3
Pt with History of Diabetes. Reports using 60 units of Lantus at bedtime and 18-25 of Premeal.  -BG on BMP at 86. PT's TDD based on weight should be 40  -Given Lantus dose incredibly high with pt's low BG on bmp, will dose for 20 of lantus and 8 of premeal as well as sliding scale  -Modify regimen based on FS  -A1c in the am   -Lipid Profile -patient w/ h/o DM-2, Hgb A1C 6.6%. Per patient report (could not verify w/ pharmacy). uses 60 units of Lantus at bedtime and 18-25 of premeal insulin. FS over last 24 hours , has not required any insulin coverage.   -decrease weight-based insulin dosing of lantus from 20 U qhs to 15 u qhs   -c/w 8 U premeal insulin for now  -will redose insulin as needed

## 2017-03-10 NOTE — PROGRESS NOTE ADULT - PROBLEM SELECTOR PLAN 5
-per patient, h/o CKD stage 3. Cr on admission 0.96, GFR 71 (stage 2)   -no acute intervention, continue to monitor  -avoid nephrotoxic drugs, renally dose meds

## 2017-03-10 NOTE — DISCHARGE NOTE ADULT - HOME CARE AGENCY
[facility] (formerly [facility])  [phone]

## 2017-03-10 NOTE — PROGRESS NOTE ADULT - PROBLEM SELECTOR PLAN 6
-patient w/ h/o HTN but has been normotensive while at [facility]  -c/w holdind home Losartan, Norvasc and Lasix, will restart PRN

## 2017-03-10 NOTE — DISCHARGE NOTE ADULT - PATIENT PORTAL LINK FT
“You can access the [facility] Patient Portal, offered by [facility], by registering with the following website: [email or web address]”

## 2017-03-10 NOTE — DISCHARGE NOTE ADULT - MEDICATION SUMMARY - MEDICATIONS TO STOP TAKING
I will STOP taking the medications listed below when I get home from the hospital:  None I will STOP taking the medications listed below when I get home from the hospital:    Norvasc 10 mg oral tablet  -- 1 tab(s) by mouth once a day    Lasix 40 mg oral tablet  -- 1 tab(s) by mouth once a day    losartan 50 mg oral tablet  -- 1 tab(s) by mouth once a day

## 2017-03-10 NOTE — PROGRESS NOTE ADULT - PROBLEM SELECTOR PLAN 3
continue Metoprolol  can discontinue IVF now that PO intake has improved  resume Lasix and Losartan over weekend - will evaluate on a daily basis

## 2017-03-10 NOTE — DISCHARGE NOTE ADULT - HOSPITAL COURSE
67 yo F with PMH Left Breast Ca (on active chemo w/ gemcitabine, last dose 2/27, s/p lumpectomy and RT), HTN, HLD, DM2, CKD Stage 3, HF s/p AICD placement, previous MI admitted 3/8 for weakness in setting of decreased PO intake, and pain and discharge from left breast. In ED, afebrile, HD stable. Admitted to [facility] for weakness, pain control, and wound care. Pain management consulted, c/w pt's home pain regimen. Also evaluated by PT and wound care. On day of dc, patient seen and examined. Afebrile, HD stable, PE significant for ___. Patient dc'd _ in stable condition. 65 yo F with PMH Left Breast Ca (on active chemo w/ gemcitabine, last dose 2/27, s/p lumpectomy and RT), HTN, HLD, DM2, CKD Stage 3, HF s/p AICD placement, previous MI admitted 3/8 for weakness in setting of decreased PO intake, and pain and discharge from left breast. In ED, afebrile, HD stable. Admitted to [facility] for weakness, pain control, and wound care. Pain management consulted, c/w pt's home pain regimen. Also evaluated by PT and wound care. On day of dc, patient seen and examined. Afebrile, HD stable, PE significant for pitting lymphedema on RUE. Patient dc'd home in stable condition w/ home PT. Following changes made to home meds: d/c anti-hypertensives in setting of low/normotension, lyrica dosage increased from 50 mg PO BID to 50 mg PO TID per pain management recs, decreased home lantus from 60 U to 15 U based on weight and needs while at [facility] (A1C 6.6%). Advised to f/u w/ PMD and oncologist w/i 1 week of discharge. Return precautions discussed, patient and daughter agreeable.

## 2017-03-10 NOTE — DISCHARGE NOTE ADULT - PLAN OF CARE
medical optimization You have a history of diabetes, on insulin at home. We decreased your insulin requirements from 60 units to 20 units while in the hospital as your finger sticks were low. However, the rehab center can titrate your insulin to your requirements while at rehab. Of note, your hemoglobin A1C was 6.6% You have a history of hypertension. However, your blood pressure was well controlled while at [facility], and as a result, you did not require your home blood pressure medications. The rehab center can help titrate your medications based on your blood pressure readings at rehab. Please take all of your medications as directed and follow up with your PMD and oncologist within 1 week of discharge You have a history of kidney disease. Please follow up with your kidney doctor for continued monitoring of your kidney function. You were admitted due to generalized weakness and a recent fall in the setting of decreased intake of food and water. The causes of your weakness could be many, and include overall deconditioning, side effects from chemotherapy, or your underlying cancer. You were evaluated by physical therapy who recommended discharge to [facility]. Please continue to eat well and work with the physical therapists at [facility] as you regain strength. You have a history of metastatic breast cancer and you are receiving treatment at [facility]. Please follow up with your oncologist for continued chemotherapy. You have a history of diabetes, on insulin at home. We decreased your insulin requirements from 60 units to 20 units while in the hospital as your finger sticks were low. However, your PMD can titrate your insulin to your requirements. Of note, your hemoglobin A1C was 6.6% You have a history of hypertension and heart failure. However, your blood pressure was well controlled while at [facility], and as a result, you did not require your home blood pressure medications. Your PMD can help titrate your medications based on your blood pressure readings as an outpatient. Please follow up with your PMD within 1 week of discharge for this. If you experience dizziness, headache, blurry vision, nausea, or lightheadedness, please go to the nearest emergency room. You were admitted due to generalized weakness and a recent fall in the setting of decreased intake of food and water. The causes of your weakness could be many, and include overall deconditioning, side effects from chemotherapy, or your underlying cancer. You were evaluated by physical therapy who recommended continued physical therapy at home. Please continue to eat well and work with the physical therapists as you regain strength. Additionally, please use the rolling walker as needed. You have a history of diabetes, on insulin at home. We decreased your insulin requirements from 60 units to 15 units while in the hospital as your finger sticks were low. However, your PMD can titrate your insulin to your requirements, and likely discontinue insulin altogether. Of note, your hemoglobin A1C was 6.6%

## 2017-03-10 NOTE — DISCHARGE NOTE ADULT - CARE PLAN
Principal Discharge DX:	Weakness  Goal:	medical optimization  Instructions for follow-up, activity and diet:	You were admitted due to generalized weakness and a recent fall in the setting of decreased intake of food and water. The causes of your weakness could be many, and include overall deconditioning, side effects from chemotherapy, or your underlying cancer. You were evaluated by physical therapy who recommended discharge to [facility]. Please continue to eat well and work with the physical therapists at [facility] as you regain strength.  Secondary Diagnosis:	Metastatic breast cancer  Instructions for follow-up, activity and diet:	You have a history of metastatic breast cancer and you are receiving treatment at [facility]. Please follow up with your oncologist for continued chemotherapy.  Secondary Diagnosis:	CKD (chronic kidney disease) stage 3, GFR 30-59 ml/min  Instructions for follow-up, activity and diet:	You have a history of kidney disease. Please follow up with your kidney doctor for continued monitoring of your kidney function.  Secondary Diagnosis:	Diabetes  Instructions for follow-up, activity and diet:	You have a history of diabetes, on insulin at home. We decreased your insulin requirements from 60 units to 20 units while in the hospital as your finger sticks were low. However, the rehab center can titrate your insulin to your requirements while at rehab. Of note, your hemoglobin A1C was 6.6%  Secondary Diagnosis:	HTN (hypertension)  Instructions for follow-up, activity and diet:	You have a history of hypertension. However, your blood pressure was well controlled while at [facility], and as a result, you did not require your home blood pressure medications. The rehab center can help titrate your medications based on your blood pressure readings at rehab.  Secondary Diagnosis:	Nutrition, metabolism, and development symptoms  Instructions for follow-up, activity and diet:	Please take all of your medications as directed and follow up with your PMD and oncologist within 1 week of discharge  Secondary Diagnosis:	Need for prophylactic measure  Instructions for follow-up, activity and diet:	Please take all of your medications as directed and follow up with your PMD and oncologist within 1 week of discharge Principal Discharge DX:	Weakness  Goal:	medical optimization  Instructions for follow-up, activity and diet:	You were admitted due to generalized weakness and a recent fall in the setting of decreased intake of food and water. The causes of your weakness could be many, and include overall deconditioning, side effects from chemotherapy, or your underlying cancer. You were evaluated by physical therapy who recommended discharge to [facility]. Please continue to eat well and work with the physical therapists at [facility] as you regain strength.  Secondary Diagnosis:	Metastatic breast cancer  Instructions for follow-up, activity and diet:	You have a history of metastatic breast cancer and you are receiving treatment at [facility]. Please follow up with your oncologist for continued chemotherapy.  Secondary Diagnosis:	CKD (chronic kidney disease) stage 3, GFR 30-59 ml/min  Instructions for follow-up, activity and diet:	You have a history of kidney disease. Please follow up with your kidney doctor for continued monitoring of your kidney function.  Secondary Diagnosis:	Diabetes  Instructions for follow-up, activity and diet:	You have a history of diabetes, on insulin at home. We decreased your insulin requirements from 60 units to 20 units while in the hospital as your finger sticks were low. However, the rehab center can titrate your insulin to your requirements while at rehab. Of note, your hemoglobin A1C was 6.6%  Secondary Diagnosis:	HTN (hypertension)  Instructions for follow-up, activity and diet:	You have a history of hypertension. However, your blood pressure was well controlled while at [facility], and as a result, you did not require your home blood pressure medications. The rehab center can help titrate your medications based on your blood pressure readings at rehab.  Secondary Diagnosis:	Nutrition, metabolism, and development symptoms  Instructions for follow-up, activity and diet:	Please take all of your medications as directed and follow up with your PMD and oncologist within 1 week of discharge  Secondary Diagnosis:	Need for prophylactic measure  Instructions for follow-up, activity and diet:	Please take all of your medications as directed and follow up with your PMD and oncologist within 1 week of discharge Principal Discharge DX:	Weakness  Goal:	medical optimization  Instructions for follow-up, activity and diet:	You were admitted due to generalized weakness and a recent fall in the setting of decreased intake of food and water. The causes of your weakness could be many, and include overall deconditioning, side effects from chemotherapy, or your underlying cancer. You were evaluated by physical therapy who recommended discharge to [facility]. Please continue to eat well and work with the physical therapists at [facility] as you regain strength.  Secondary Diagnosis:	Metastatic breast cancer  Instructions for follow-up, activity and diet:	You have a history of metastatic breast cancer and you are receiving treatment at [facility]. Please follow up with your oncologist for continued chemotherapy.  Secondary Diagnosis:	CKD (chronic kidney disease) stage 3, GFR 30-59 ml/min  Instructions for follow-up, activity and diet:	You have a history of kidney disease. Please follow up with your kidney doctor for continued monitoring of your kidney function.  Secondary Diagnosis:	Diabetes  Instructions for follow-up, activity and diet:	You have a history of diabetes, on insulin at home. We decreased your insulin requirements from 60 units to 20 units while in the hospital as your finger sticks were low. However, the rehab center can titrate your insulin to your requirements while at rehab. Of note, your hemoglobin A1C was 6.6%  Secondary Diagnosis:	HTN (hypertension)  Instructions for follow-up, activity and diet:	You have a history of hypertension. However, your blood pressure was well controlled while at [facility], and as a result, you did not require your home blood pressure medications. The rehab center can help titrate your medications based on your blood pressure readings at rehab.  Secondary Diagnosis:	Nutrition, metabolism, and development symptoms  Instructions for follow-up, activity and diet:	Please take all of your medications as directed and follow up with your PMD and oncologist within 1 week of discharge  Secondary Diagnosis:	Need for prophylactic measure  Instructions for follow-up, activity and diet:	Please take all of your medications as directed and follow up with your PMD and oncologist within 1 week of discharge Principal Discharge DX:	Weakness  Goal:	medical optimization  Instructions for follow-up, activity and diet:	You were admitted due to generalized weakness and a recent fall in the setting of decreased intake of food and water. The causes of your weakness could be many, and include overall deconditioning, side effects from chemotherapy, or your underlying cancer. You were evaluated by physical therapy who recommended continued physical therapy at home. Please continue to eat well and work with the physical therapists as you regain strength. Additionally, please use the rolling walker as needed.  Secondary Diagnosis:	Metastatic breast cancer  Instructions for follow-up, activity and diet:	You have a history of metastatic breast cancer and you are receiving treatment at [facility]. Please follow up with your oncologist for continued chemotherapy.  Secondary Diagnosis:	CKD (chronic kidney disease) stage 3, GFR 30-59 ml/min  Instructions for follow-up, activity and diet:	You have a history of kidney disease. Please follow up with your kidney doctor for continued monitoring of your kidney function.  Secondary Diagnosis:	Diabetes  Instructions for follow-up, activity and diet:	You have a history of diabetes, on insulin at home. We decreased your insulin requirements from 60 units to 20 units while in the hospital as your finger sticks were low. However, your PMD can titrate your insulin to your requirements. Of note, your hemoglobin A1C was 6.6%  Secondary Diagnosis:	HTN (hypertension)  Instructions for follow-up, activity and diet:	You have a history of hypertension and heart failure. However, your blood pressure was well controlled while at [facility], and as a result, you did not require your home blood pressure medications. Your PMD can help titrate your medications based on your blood pressure readings as an outpatient. Please follow up with your PMD within 1 week of discharge for this. If you experience dizziness, headache, blurry vision, nausea, or lightheadedness, please go to the nearest emergency room.  Secondary Diagnosis:	Nutrition, metabolism, and development symptoms  Instructions for follow-up, activity and diet:	Please take all of your medications as directed and follow up with your PMD and oncologist within 1 week of discharge  Secondary Diagnosis:	Need for prophylactic measure  Instructions for follow-up, activity and diet:	Please take all of your medications as directed and follow up with your PMD and oncologist within 1 week of discharge Principal Discharge DX:	Weakness  Goal:	medical optimization  Instructions for follow-up, activity and diet:	You were admitted due to generalized weakness and a recent fall in the setting of decreased intake of food and water. The causes of your weakness could be many, and include overall deconditioning, side effects from chemotherapy, or your underlying cancer. You were evaluated by physical therapy who recommended continued physical therapy at home. Please continue to eat well and work with the physical therapists as you regain strength. Additionally, please use the rolling walker as needed.  Secondary Diagnosis:	Metastatic breast cancer  Instructions for follow-up, activity and diet:	You have a history of metastatic breast cancer and you are receiving treatment at [facility]. Please follow up with your oncologist for continued chemotherapy.  Secondary Diagnosis:	CKD (chronic kidney disease) stage 3, GFR 30-59 ml/min  Instructions for follow-up, activity and diet:	You have a history of kidney disease. Please follow up with your kidney doctor for continued monitoring of your kidney function.  Secondary Diagnosis:	Diabetes  Instructions for follow-up, activity and diet:	You have a history of diabetes, on insulin at home. We decreased your insulin requirements from 60 units to 15 units while in the hospital as your finger sticks were low. However, your PMD can titrate your insulin to your requirements, and likely discontinue insulin altogether. Of note, your hemoglobin A1C was 6.6%  Secondary Diagnosis:	HTN (hypertension)  Instructions for follow-up, activity and diet:	You have a history of hypertension and heart failure. However, your blood pressure was well controlled while at [facility], and as a result, you did not require your home blood pressure medications. Your PMD can help titrate your medications based on your blood pressure readings as an outpatient. Please follow up with your PMD within 1 week of discharge for this. If you experience dizziness, headache, blurry vision, nausea, or lightheadedness, please go to the nearest emergency room.  Secondary Diagnosis:	Nutrition, metabolism, and development symptoms  Instructions for follow-up, activity and diet:	Please take all of your medications as directed and follow up with your PMD and oncologist within 1 week of discharge  Secondary Diagnosis:	Need for prophylactic measure  Instructions for follow-up, activity and diet:	Please take all of your medications as directed and follow up with your PMD and oncologist within 1 week of discharge Principal Discharge DX:	Weakness  Goal:	medical optimization  Instructions for follow-up, activity and diet:	You were admitted due to generalized weakness and a recent fall in the setting of decreased intake of food and water. The causes of your weakness could be many, and include overall deconditioning, side effects from chemotherapy, or your underlying cancer. You were evaluated by physical therapy who recommended continued physical therapy at home. Please continue to eat well and work with the physical therapists as you regain strength. Additionally, please use the rolling walker as needed.  Secondary Diagnosis:	Metastatic breast cancer  Instructions for follow-up, activity and diet:	You have a history of metastatic breast cancer and you are receiving treatment at [facility]. Please follow up with your oncologist for continued chemotherapy.  Secondary Diagnosis:	CKD (chronic kidney disease) stage 3, GFR 30-59 ml/min  Instructions for follow-up, activity and diet:	You have a history of kidney disease. Please follow up with your kidney doctor for continued monitoring of your kidney function.  Secondary Diagnosis:	Diabetes  Instructions for follow-up, activity and diet:	You have a history of diabetes, on insulin at home. We decreased your insulin requirements from 60 units to 15 units while in the hospital as your finger sticks were low. However, your PMD can titrate your insulin to your requirements, and likely discontinue insulin altogether. Of note, your hemoglobin A1C was 6.6%  Secondary Diagnosis:	HTN (hypertension)  Instructions for follow-up, activity and diet:	You have a history of hypertension and heart failure. However, your blood pressure was well controlled while at [facility], and as a result, you did not require your home blood pressure medications. Your PMD can help titrate your medications based on your blood pressure readings as an outpatient. Please follow up with your PMD within 1 week of discharge for this. If you experience dizziness, headache, blurry vision, nausea, or lightheadedness, please go to the nearest emergency room.  Secondary Diagnosis:	Nutrition, metabolism, and development symptoms  Instructions for follow-up, activity and diet:	Please take all of your medications as directed and follow up with your PMD and oncologist within 1 week of discharge  Secondary Diagnosis:	Need for prophylactic measure  Instructions for follow-up, activity and diet:	Please take all of your medications as directed and follow up with your PMD and oncologist within 1 week of discharge Principal Discharge DX:	Weakness  Goal:	medical optimization  Instructions for follow-up, activity and diet:	You were admitted due to generalized weakness and a recent fall in the setting of decreased intake of food and water. The causes of your weakness could be many, and include overall deconditioning, side effects from chemotherapy, or your underlying cancer. You were evaluated by physical therapy who recommended continued physical therapy at home. Please continue to eat well and work with the physical therapists as you regain strength. Additionally, please use the rolling walker as needed.  Secondary Diagnosis:	Metastatic breast cancer  Instructions for follow-up, activity and diet:	You have a history of metastatic breast cancer and you are receiving treatment at [facility]. Please follow up with your oncologist for continued chemotherapy.  Secondary Diagnosis:	CKD (chronic kidney disease) stage 3, GFR 30-59 ml/min  Instructions for follow-up, activity and diet:	You have a history of kidney disease. Please follow up with your kidney doctor for continued monitoring of your kidney function.  Secondary Diagnosis:	Diabetes  Instructions for follow-up, activity and diet:	You have a history of diabetes, on insulin at home. We decreased your insulin requirements from 60 units to 15 units while in the hospital as your finger sticks were low. However, your PMD can titrate your insulin to your requirements, and likely discontinue insulin altogether. Of note, your hemoglobin A1C was 6.6%  Secondary Diagnosis:	HTN (hypertension)  Instructions for follow-up, activity and diet:	You have a history of hypertension and heart failure. However, your blood pressure was well controlled while at [facility], and as a result, you did not require your home blood pressure medications. Your PMD can help titrate your medications based on your blood pressure readings as an outpatient. Please follow up with your PMD within 1 week of discharge for this. If you experience dizziness, headache, blurry vision, nausea, or lightheadedness, please go to the nearest emergency room.  Secondary Diagnosis:	Nutrition, metabolism, and development symptoms  Instructions for follow-up, activity and diet:	Please take all of your medications as directed and follow up with your PMD and oncologist within 1 week of discharge  Secondary Diagnosis:	Need for prophylactic measure  Instructions for follow-up, activity and diet:	Please take all of your medications as directed and follow up with your PMD and oncologist within 1 week of discharge Principal Discharge DX:	Weakness  Goal:	medical optimization  Instructions for follow-up, activity and diet:	You were admitted due to generalized weakness and a recent fall in the setting of decreased intake of food and water. The causes of your weakness could be many, and include overall deconditioning, side effects from chemotherapy, or your underlying cancer. You were evaluated by physical therapy who recommended continued physical therapy at home. Please continue to eat well and work with the physical therapists as you regain strength. Additionally, please use the rolling walker as needed.  Secondary Diagnosis:	Metastatic breast cancer  Instructions for follow-up, activity and diet:	You have a history of metastatic breast cancer and you are receiving treatment at [facility]. Please follow up with your oncologist for continued chemotherapy.  Secondary Diagnosis:	CKD (chronic kidney disease) stage 3, GFR 30-59 ml/min  Instructions for follow-up, activity and diet:	You have a history of kidney disease. Please follow up with your kidney doctor for continued monitoring of your kidney function.  Secondary Diagnosis:	Diabetes  Instructions for follow-up, activity and diet:	You have a history of diabetes, on insulin at home. We decreased your insulin requirements from 60 units to 15 units while in the hospital as your finger sticks were low. However, your PMD can titrate your insulin to your requirements, and likely discontinue insulin altogether. Of note, your hemoglobin A1C was 6.6%  Secondary Diagnosis:	HTN (hypertension)  Instructions for follow-up, activity and diet:	You have a history of hypertension and heart failure. However, your blood pressure was well controlled while at [facility], and as a result, you did not require your home blood pressure medications. Your PMD can help titrate your medications based on your blood pressure readings as an outpatient. Please follow up with your PMD within 1 week of discharge for this. If you experience dizziness, headache, blurry vision, nausea, or lightheadedness, please go to the nearest emergency room.  Secondary Diagnosis:	Nutrition, metabolism, and development symptoms  Instructions for follow-up, activity and diet:	Please take all of your medications as directed and follow up with your PMD and oncologist within 1 week of discharge  Secondary Diagnosis:	Need for prophylactic measure  Instructions for follow-up, activity and diet:	Please take all of your medications as directed and follow up with your PMD and oncologist within 1 week of discharge Principal Discharge DX:	Weakness  Goal:	medical optimization  Instructions for follow-up, activity and diet:	You were admitted due to generalized weakness and a recent fall in the setting of decreased intake of food and water. The causes of your weakness could be many, and include overall deconditioning, side effects from chemotherapy, or your underlying cancer. You were evaluated by physical therapy who recommended continued physical therapy at home. Please continue to eat well and work with the physical therapists as you regain strength. Additionally, please use the rolling walker as needed.  Secondary Diagnosis:	Metastatic breast cancer  Instructions for follow-up, activity and diet:	You have a history of metastatic breast cancer and you are receiving treatment at [facility]. Please follow up with your oncologist for continued chemotherapy.  Secondary Diagnosis:	CKD (chronic kidney disease) stage 3, GFR 30-59 ml/min  Instructions for follow-up, activity and diet:	You have a history of kidney disease. Please follow up with your kidney doctor for continued monitoring of your kidney function.  Secondary Diagnosis:	Diabetes  Instructions for follow-up, activity and diet:	You have a history of diabetes, on insulin at home. We decreased your insulin requirements from 60 units to 15 units while in the hospital as your finger sticks were low. However, your PMD can titrate your insulin to your requirements, and likely discontinue insulin altogether. Of note, your hemoglobin A1C was 6.6%  Secondary Diagnosis:	HTN (hypertension)  Instructions for follow-up, activity and diet:	You have a history of hypertension and heart failure. However, your blood pressure was well controlled while at [facility], and as a result, you did not require your home blood pressure medications. Your PMD can help titrate your medications based on your blood pressure readings as an outpatient. Please follow up with your PMD within 1 week of discharge for this. If you experience dizziness, headache, blurry vision, nausea, or lightheadedness, please go to the nearest emergency room.  Secondary Diagnosis:	Nutrition, metabolism, and development symptoms  Instructions for follow-up, activity and diet:	Please take all of your medications as directed and follow up with your PMD and oncologist within 1 week of discharge  Secondary Diagnosis:	Need for prophylactic measure  Instructions for follow-up, activity and diet:	Please take all of your medications as directed and follow up with your PMD and oncologist within 1 week of discharge Principal Discharge DX:	Weakness  Goal:	medical optimization  Instructions for follow-up, activity and diet:	You were admitted due to generalized weakness and a recent fall in the setting of decreased intake of food and water. The causes of your weakness could be many, and include overall deconditioning, side effects from chemotherapy, or your underlying cancer. You were evaluated by physical therapy who recommended continued physical therapy at home. Please continue to eat well and work with the physical therapists as you regain strength. Additionally, please use the rolling walker as needed.  Secondary Diagnosis:	Metastatic breast cancer  Instructions for follow-up, activity and diet:	You have a history of metastatic breast cancer and you are receiving treatment at [facility]. Please follow up with your oncologist for continued chemotherapy.  Secondary Diagnosis:	CKD (chronic kidney disease) stage 3, GFR 30-59 ml/min  Instructions for follow-up, activity and diet:	You have a history of kidney disease. Please follow up with your kidney doctor for continued monitoring of your kidney function.  Secondary Diagnosis:	Diabetes  Instructions for follow-up, activity and diet:	You have a history of diabetes, on insulin at home. We decreased your insulin requirements from 60 units to 15 units while in the hospital as your finger sticks were low. However, your PMD can titrate your insulin to your requirements, and likely discontinue insulin altogether. Of note, your hemoglobin A1C was 6.6%  Secondary Diagnosis:	HTN (hypertension)  Instructions for follow-up, activity and diet:	You have a history of hypertension and heart failure. However, your blood pressure was well controlled while at [facility], and as a result, you did not require your home blood pressure medications. Your PMD can help titrate your medications based on your blood pressure readings as an outpatient. Please follow up with your PMD within 1 week of discharge for this. If you experience dizziness, headache, blurry vision, nausea, or lightheadedness, please go to the nearest emergency room.  Secondary Diagnosis:	Nutrition, metabolism, and development symptoms  Instructions for follow-up, activity and diet:	Please take all of your medications as directed and follow up with your PMD and oncologist within 1 week of discharge  Secondary Diagnosis:	Need for prophylactic measure  Instructions for follow-up, activity and diet:	Please take all of your medications as directed and follow up with your PMD and oncologist within 1 week of discharge

## 2017-03-10 NOTE — DISCHARGE NOTE ADULT - CARE PROVIDER_API CALL
Osvaldo Zhu), Internal Medicine  [address] [place] [address], [place] [id number]  Phone: [phone]  Fax: [phone]

## 2017-03-11 DIAGNOSIS — R52 PAIN, UNSPECIFIED: ICD-10-CM

## 2017-03-11 RX ADMIN — OXYCODONE HYDROCHLORIDE 15 MILLIGRAM(S): 5 TABLET ORAL at 21:44

## 2017-03-11 RX ADMIN — Medication 81 MILLIGRAM(S): at 11:24

## 2017-03-11 RX ADMIN — OXYCODONE HYDROCHLORIDE 15 MILLIGRAM(S): 5 TABLET ORAL at 07:38

## 2017-03-11 RX ADMIN — Medication 8 UNIT(S): at 11:25

## 2017-03-11 RX ADMIN — Medication 2 MILLIGRAM(S): at 06:35

## 2017-03-11 RX ADMIN — POLYETHYLENE GLYCOL 3350 17 GRAM(S): 17 POWDER, FOR SOLUTION ORAL at 11:27

## 2017-03-11 RX ADMIN — Medication 50 MILLIGRAM(S): at 13:02

## 2017-03-11 RX ADMIN — OXYCODONE HYDROCHLORIDE 15 MILLIGRAM(S): 5 TABLET ORAL at 22:45

## 2017-03-11 RX ADMIN — Medication 8 UNIT(S): at 08:59

## 2017-03-11 RX ADMIN — Medication 100 MILLIGRAM(S): at 06:35

## 2017-03-11 RX ADMIN — INSULIN GLARGINE 15 UNIT(S): 100 INJECTION, SOLUTION SUBCUTANEOUS at 21:41

## 2017-03-11 RX ADMIN — Medication 50 MILLIGRAM(S): at 06:35

## 2017-03-11 RX ADMIN — Medication 2 MILLIGRAM(S): at 18:26

## 2017-03-11 RX ADMIN — ENOXAPARIN SODIUM 40 MILLIGRAM(S): 100 INJECTION SUBCUTANEOUS at 11:25

## 2017-03-11 RX ADMIN — OXYCODONE HYDROCHLORIDE 15 MILLIGRAM(S): 5 TABLET ORAL at 07:02

## 2017-03-11 RX ADMIN — Medication 50 MILLIGRAM(S): at 21:44

## 2017-03-11 NOTE — PROGRESS NOTE ADULT - SUBJECTIVE AND OBJECTIVE BOX
CC: FAILURE TO THRIVE IN ADULT      INTERVAL HISTORY: complains of itching on the Left breast      ROS: No chest pain, no sob, no abd pain. No n/v/d    PAST MEDICAL & SURGICAL HISTORY:  Neuropathy  Heart failure  AICD (automatic cardioverter/defibrillator) present  MI, old  Diabetes  HLD (hyperlipidemia)  HTN (hypertension)  Breast CA  Status post left breast lumpectomy  AICD (automatic cardioverter/defibrillator) present      PHYSICAL EXAM:  T(C): 36.5, Max: 36.8 (03-10 @ 16:40)  HR: 69  BP: 108/70 (108/70 - 132/86)  RR: 19  SpO2: 95%  Wt(kg): --  I&O's Summary    Weight 81 ( @ 00:30)  General: AAO x 3,  NAD.  HEENT: moist mucous membranes, no pallor/cyanosis.  Neck: no JVD visible.  Cardiac: S1, S2. RRR. No murmurs   Respratory: CTA b/l, no access muscle use.   Abdomen: soft. nontender. nondistended  Skin: Left breast smaller than right.  darkenning and hardening of the skin.   Extremities: no LE edema b/l  Access:       DATA:    Ferritin, Serum: 388.3 ng/mL <H> ( @ 06:34)          TPro  6.4 g/dL  /  Alb  2.4 g/dL<L>  /  TBili  1.2 mg/dL  /  DBili  x      /  AST  22 U/L  /  ALT  17 U/L  /  AlkPhos  136 U/L<H>  08 Mar 2017 21:03        Urinalysis Basic - ( 08 Mar 2017 22:41 )  Color: Yellow / Appearance: Clear / S.020 / pH: x  Gluc: x / Ketone: 15 mg/dL<!!>  / Bili: Moderate<!!> / Urobili: 1.0 E.U./dL   Blood: x / Protein: Trace mg/dL<!!> / Nitrite: NEGATIVE   Leuk Esterase: NEGATIVE / RBC: x / WBC < 5 /HPF   Sq Epi: x / Non Sq Epi: Many /HPF<!!> / Bacteria: Present /HPF<!!>                MEDICATIONS  (STANDING):  enoxaparin Injectable 40milliGRAM(s) SubCutaneous daily  aspirin enteric coated 81milliGRAM(s) Oral daily  fentaNYL   Patch  25 MICROgram(s)/Hr 1Patch Transdermal every 72 hours  insulin lispro Injectable (HumaLOG) 8Unit(s) SubCutaneous three times a day before meals  metoprolol succinate ER 100milliGRAM(s) Oral daily  insulin lispro (HumaLOG) corrective regimen sliding scale  SubCutaneous Before meals and at bedtime  polyethylene glycol 3350 17Gram(s) Oral daily  diazepam    Tablet 2milliGRAM(s) Oral every 12 hours  insulin glargine Injectable (LANTUS) 15Unit(s) SubCutaneous at bedtime  pregabalin 50milliGRAM(s) Oral every 8 hours    MEDICATIONS  (PRN):  ondansetron    Tablet 4milliGRAM(s) Oral every 8 hours PRN Nausea and/or Vomiting  oxyCODONE IR 15milliGRAM(s) Oral every 4 hours PRN Severe Pain (7 - 10)

## 2017-03-12 DIAGNOSIS — N17.9 ACUTE KIDNEY FAILURE, UNSPECIFIED: ICD-10-CM

## 2017-03-12 LAB
ALBUMIN SERPL ELPH-MCNC: 2.3 G/DL — LOW (ref 3.4–5)
ALP SERPL-CCNC: 114 U/L — SIGNIFICANT CHANGE UP (ref 40–120)
ALT FLD-CCNC: 15 U/L — SIGNIFICANT CHANGE UP (ref 12–42)
ANION GAP SERPL CALC-SCNC: 10 MMOL/L — SIGNIFICANT CHANGE UP (ref 9–16)
AST SERPL-CCNC: 19 U/L — SIGNIFICANT CHANGE UP (ref 15–37)
BILIRUB SERPL-MCNC: 0.6 MG/DL — SIGNIFICANT CHANGE UP (ref 0.2–1.2)
BUN SERPL-MCNC: 19 MG/DL — SIGNIFICANT CHANGE UP (ref 7–23)
CALCIUM SERPL-MCNC: 8.8 MG/DL — SIGNIFICANT CHANGE UP (ref 8.5–10.5)
CHLORIDE SERPL-SCNC: 107 MMOL/L — SIGNIFICANT CHANGE UP (ref 96–108)
CO2 SERPL-SCNC: 23 MMOL/L — SIGNIFICANT CHANGE UP (ref 22–31)
CREAT SERPL-MCNC: 2.07 MG/DL — HIGH (ref 0.5–1.3)
GLUCOSE SERPL-MCNC: 92 MG/DL — SIGNIFICANT CHANGE UP (ref 70–99)
HCT VFR BLD CALC: 30.3 % — LOW (ref 34.5–45)
HGB BLD-MCNC: 9.1 G/DL — LOW (ref 11.5–15.5)
MAGNESIUM SERPL-MCNC: 2 MG/DL — SIGNIFICANT CHANGE UP (ref 1.6–2.4)
MCHC RBC-ENTMCNC: 28.3 PG — SIGNIFICANT CHANGE UP (ref 27–34)
MCHC RBC-ENTMCNC: 30 G/DL — LOW (ref 32–36)
MCV RBC AUTO: 94.4 FL — SIGNIFICANT CHANGE UP (ref 80–100)
PLATELET # BLD AUTO: 179 K/UL — SIGNIFICANT CHANGE UP (ref 150–400)
POTASSIUM SERPL-MCNC: 4 MMOL/L — SIGNIFICANT CHANGE UP (ref 3.5–5.3)
POTASSIUM SERPL-SCNC: 4 MMOL/L — SIGNIFICANT CHANGE UP (ref 3.5–5.3)
PROT SERPL-MCNC: 5.9 G/DL — LOW (ref 6.4–8.2)
RBC # BLD: 3.21 M/UL — LOW (ref 3.8–5.2)
RBC # FLD: 16.8 % — SIGNIFICANT CHANGE UP (ref 10.3–16.9)
SODIUM SERPL-SCNC: 140 MMOL/L — SIGNIFICANT CHANGE UP (ref 135–145)
WBC # BLD: 4.8 K/UL — SIGNIFICANT CHANGE UP (ref 3.8–10.5)
WBC # FLD AUTO: 4.8 K/UL — SIGNIFICANT CHANGE UP (ref 3.8–10.5)

## 2017-03-12 RX ADMIN — OXYCODONE HYDROCHLORIDE 15 MILLIGRAM(S): 5 TABLET ORAL at 18:46

## 2017-03-12 RX ADMIN — Medication 50 MILLIGRAM(S): at 05:16

## 2017-03-12 RX ADMIN — Medication 2: at 18:48

## 2017-03-12 RX ADMIN — Medication 2 MILLIGRAM(S): at 17:02

## 2017-03-12 RX ADMIN — Medication 8 UNIT(S): at 08:53

## 2017-03-12 RX ADMIN — Medication 8 UNIT(S): at 18:48

## 2017-03-12 RX ADMIN — POLYETHYLENE GLYCOL 3350 17 GRAM(S): 17 POWDER, FOR SOLUTION ORAL at 11:05

## 2017-03-12 RX ADMIN — Medication 81 MILLIGRAM(S): at 11:06

## 2017-03-12 RX ADMIN — Medication 2: at 23:29

## 2017-03-12 RX ADMIN — Medication 50 MILLIGRAM(S): at 23:28

## 2017-03-12 RX ADMIN — Medication 50 MILLIGRAM(S): at 13:32

## 2017-03-12 RX ADMIN — FENTANYL CITRATE 1 PATCH: 50 INJECTION INTRAVENOUS at 16:42

## 2017-03-12 RX ADMIN — Medication 100 MILLIGRAM(S): at 05:15

## 2017-03-12 RX ADMIN — Medication 2 MILLIGRAM(S): at 05:14

## 2017-03-12 RX ADMIN — FENTANYL CITRATE 1 PATCH: 50 INJECTION INTRAVENOUS at 16:47

## 2017-03-12 RX ADMIN — INSULIN GLARGINE 15 UNIT(S): 100 INJECTION, SOLUTION SUBCUTANEOUS at 23:29

## 2017-03-12 RX ADMIN — ENOXAPARIN SODIUM 40 MILLIGRAM(S): 100 INJECTION SUBCUTANEOUS at 11:06

## 2017-03-12 NOTE — PROGRESS NOTE ADULT - PROBLEM SELECTOR PROBLEM 2
Metastatic breast cancer
Metastatic breast cancer
Diabetes
Metastatic breast cancer
Metastatic breast cancer

## 2017-03-12 NOTE — PROGRESS NOTE ADULT - SUBJECTIVE AND OBJECTIVE BOX
INTERVAL HPI/OVERNIGHT EVENTS: DIANA o/n    SUBJECTIVE: Patient seen and examined at bedside.       OBJECTIVE:    VITAL SIGNS:  ICU Vital Signs Last 24 Hrs  T(C): 36.6, Max: 36.6 (03-12 @ 08:30)  T(F): 97.8, Max: 97.8 (03-12 @ 08:30)  HR: 63 (63 - 76)  BP: 109/68 (103/68 - 138/82)  BP(mean): --  ABP: --  ABP(mean): --  RR: 20 (17 - 20)  SpO2: 96% (95% - 97%)        CAPILLARY BLOOD GLUCOSE  79 (12 Mar 2017 11:50)      PHYSICAL EXAM:    General-elderly female, NAD, sitting up eating breakfast, conversive, pleasant  HEENT-dry oral mucosa w/ white colored discharge on tongue  Lungs-CTAB  Heart-RRR, normal S1/S2  Chest- R breast swollen vs L, L breast with significant skin changes, exquisitely TTP, minimal discharge from region under L breast   Abdomen-soft, NTND, bowel sounds present  Hcogxqqenhs-DZW-jin, no edema. RUE-wwp, no edema. LUE-significant pitting lymphedema extending to mid arm  Neuro-A&O x3, responds to all commands, moves all extremities      MEDICATIONS:  MEDICATIONS  (STANDING):  enoxaparin Injectable 40milliGRAM(s) SubCutaneous daily  aspirin enteric coated 81milliGRAM(s) Oral daily  fentaNYL   Patch  25 MICROgram(s)/Hr 1Patch Transdermal every 72 hours  insulin lispro Injectable (HumaLOG) 8Unit(s) SubCutaneous three times a day before meals  metoprolol succinate ER 100milliGRAM(s) Oral daily  insulin lispro (HumaLOG) corrective regimen sliding scale  SubCutaneous Before meals and at bedtime  polyethylene glycol 3350 17Gram(s) Oral daily  diazepam    Tablet 2milliGRAM(s) Oral every 12 hours  insulin glargine Injectable (LANTUS) 15Unit(s) SubCutaneous at bedtime  pregabalin 50milliGRAM(s) Oral every 8 hours    MEDICATIONS  (PRN):  ondansetron    Tablet 4milliGRAM(s) Oral every 8 hours PRN Nausea and/or Vomiting  oxyCODONE IR 15milliGRAM(s) Oral every 4 hours PRN Severe Pain (7 - 10)      ALLERGIES:  Allergies    codeine (Unknown)    Intolerances        LABS:                        9.1    4.8   )-----------( 179      ( 12 Mar 2017 06:27 )             30.3     12 Mar 2017 06:27    140    |  107    |  19     ----------------------------<  92     4.0     |  23     |  2.07     Ca    8.8        12 Mar 2017 06:27  Mg     2.0       12 Mar 2017 06:27    TPro  5.9    /  Alb  2.3    /  TBili  0.6    /  DBili  x      /  AST  19     /  ALT  15     /  AlkPhos  114    12 Mar 2017 06:27          RADIOLOGY & ADDITIONAL TESTS: Reviewed.

## 2017-03-12 NOTE — PROGRESS NOTE ADULT - PROBLEM SELECTOR PLAN 4
-patient w/ known h/o HF s/p MI (EF unknown), s/p AICD placement  -c/w home metoprolol succinate 100 mg BID  -holding home losartan, lasix in setting of normotension  -holding home pravastatin in setting of significant leg cramping   -c/w home ASA

## 2017-03-12 NOTE — PROGRESS NOTE ADULT - PROBLEM SELECTOR PLAN 1
unclear etiology  encourage PO intake  Strict Is and Os  if repeat chemistries tonight show continued rise in Scret please check renal sonogram and Uosm, Jessi, [place]

## 2017-03-12 NOTE — PROGRESS NOTE ADULT - SUBJECTIVE AND OBJECTIVE BOX
CC: FAILURE TO THRIVE IN ADULT      INTERVAL HISTORY: feels okay  no complaints      ROS: No chest pain, no sob, no abd pain. No n/v/d    PAST MEDICAL & SURGICAL HISTORY:  Neuropathy  Heart failure  AICD (automatic cardioverter/defibrillator) present  MI, old  Diabetes  HLD (hyperlipidemia)  HTN (hypertension)  Breast CA  Status post left breast lumpectomy  AICD (automatic cardioverter/defibrillator) present      PHYSICAL EXAM:  T(C): 36.6, Max: 36.6 ( @ 08:30)  HR: 63  BP: 109/68 (103/68 - 138/82)  RR: 20  SpO2: 96%  Wt(kg): --  I&O's Summary    Weight 81 ( @ 00:30)  General: AAO x 3,  NAD.  HEENT: moist mucous membranes, no pallor/cyanosis.  Neck: no JVD visible.  Cardiac: S1, S2. RRR. No murmurs   Respratory: CTA b/l, no access muscle use.   Abdomen: soft. nontender. nondistended  Skin: no rashes.darkened and hardened Left breast  Extremities: no LE edema b/l  Access:       DATA:                        9.1<L>  4.8   )-----------( 179      ( 12 Mar 2017 06:27 )             30.3<L>    Ferritin, Serum: 388.3 ng/mL <H> ( @ 06:34)      140    |  107    |  19     ----------------------------<  92     Ca:8.8   (12 Mar 2017 06:27)  4.0     |  23     |  2.07<H>      eGFR if Non : 24 <L>  eGFR if : 28 <L>    TPro  5.9 g/dL<L>  /  Alb  2.3 g/dL<L>  /  TBili  0.6 mg/dL  /  DBili  x      /  AST  19 U/L  /  ALT  15 U/L  /  AlkPhos  114 U/L  12 Mar 2017 06:27        Urinalysis Basic - ( 08 Mar 2017 22:41 )  Color: Yellow / Appearance: Clear / S.020 / pH: x  Gluc: x / Ketone: 15 mg/dL<!!>  / Bili: Moderate<!!> / Urobili: 1.0 E.U./dL   Blood: x / Protein: Trace mg/dL<!!> / Nitrite: NEGATIVE   Leuk Esterase: NEGATIVE / RBC: x / WBC < 5 /HPF   Sq Epi: x / Non Sq Epi: Many /HPF<!!> / Bacteria: Present /HPF<!!>                MEDICATIONS  (STANDING):  enoxaparin Injectable 40milliGRAM(s) SubCutaneous daily  aspirin enteric coated 81milliGRAM(s) Oral daily  fentaNYL   Patch  25 MICROgram(s)/Hr 1Patch Transdermal every 72 hours  insulin lispro Injectable (HumaLOG) 8Unit(s) SubCutaneous three times a day before meals  metoprolol succinate ER 100milliGRAM(s) Oral daily  insulin lispro (HumaLOG) corrective regimen sliding scale  SubCutaneous Before meals and at bedtime  polyethylene glycol 3350 17Gram(s) Oral daily  diazepam    Tablet 2milliGRAM(s) Oral every 12 hours  insulin glargine Injectable (LANTUS) 15Unit(s) SubCutaneous at bedtime  pregabalin 50milliGRAM(s) Oral every 8 hours    MEDICATIONS  (PRN):  ondansetron    Tablet 4milliGRAM(s) Oral every 8 hours PRN Nausea and/or Vomiting  oxyCODONE IR 15milliGRAM(s) Oral every 4 hours PRN Severe Pain (7 - 10)

## 2017-03-12 NOTE — PROGRESS NOTE ADULT - PROBLEM SELECTOR PLAN 2
-patient w/ h/o recurrent (as of 11/2016) left breast cancer w/ mets to bone. Patient f/u w/ onc at [facility]. Has been on RT in the past, currently on chemotherapy w/ gemcitabine, last dose 2/26, missed dose 3/6 2/2 daughter being sick. Exam significant for R breast swelling w/ L breast skin thickening and hardening w/ decreased mobility and exquisitely TTP. Skin breakdown under L breast w/ some non purulent discharge  -wound care on board, appreciate recs  -pain mgmt on board, appreciate recs: c/w home pain regimen- oxyocodone 15 mg q4hrs, diazepam 2 mg PO BID , lyrica 50 mg BID, fentanyl 25 mcg patch q72 hrs  -will consider palliative consult for additional support

## 2017-03-12 NOTE — PROGRESS NOTE ADULT - PROBLEM SELECTOR PLAN 3
-patient w/ h/o DM-2, Hgb A1C 6.6%. Per patient report (could not verify w/ pharmacy). uses 60 units of Lantus at bedtime and 18-25 of premeal insulin. FS over last 24 hours , has not required any insulin coverage.   -decrease weight-based insulin dosing of lantus from 20 U qhs to 15 u qhs   -c/w 8 U premeal insulin for now  -will redose insulin as needed

## 2017-03-12 NOTE — PROGRESS NOTE ADULT - PROBLEM SELECTOR PLAN 6
-patient w/ h/o HTN but has been normotensive while at [facility]  -c/w holdind home Losartan, Norvasc and Lasix, will restart PRN

## 2017-03-13 VITALS
DIASTOLIC BLOOD PRESSURE: 67 MMHG | HEART RATE: 68 BPM | TEMPERATURE: 98 F | OXYGEN SATURATION: 95 % | SYSTOLIC BLOOD PRESSURE: 110 MMHG | RESPIRATION RATE: 18 BRPM

## 2017-03-13 LAB
ANION GAP SERPL CALC-SCNC: 13 MMOL/L — SIGNIFICANT CHANGE UP (ref 9–16)
BUN SERPL-MCNC: 21 MG/DL — SIGNIFICANT CHANGE UP (ref 7–23)
CALCIUM SERPL-MCNC: 8.3 MG/DL — LOW (ref 8.5–10.5)
CHLORIDE SERPL-SCNC: 108 MMOL/L — SIGNIFICANT CHANGE UP (ref 96–108)
CO2 SERPL-SCNC: 19 MMOL/L — LOW (ref 22–31)
CREAT SERPL-MCNC: 1.6 MG/DL — HIGH (ref 0.5–1.3)
GLUCOSE SERPL-MCNC: 88 MG/DL — SIGNIFICANT CHANGE UP (ref 70–99)
HCT VFR BLD CALC: 28.4 % — LOW (ref 34.5–45)
HGB BLD-MCNC: 8.7 G/DL — LOW (ref 11.5–15.5)
MAGNESIUM SERPL-MCNC: 1.8 MG/DL — SIGNIFICANT CHANGE UP (ref 1.6–2.4)
MCHC RBC-ENTMCNC: 28.7 PG — SIGNIFICANT CHANGE UP (ref 27–34)
MCHC RBC-ENTMCNC: 30.6 G/DL — LOW (ref 32–36)
MCV RBC AUTO: 93.7 FL — SIGNIFICANT CHANGE UP (ref 80–100)
PLATELET # BLD AUTO: 186 K/UL — SIGNIFICANT CHANGE UP (ref 150–400)
POTASSIUM SERPL-MCNC: 4.3 MMOL/L — SIGNIFICANT CHANGE UP (ref 3.5–5.3)
POTASSIUM SERPL-SCNC: 4.3 MMOL/L — SIGNIFICANT CHANGE UP (ref 3.5–5.3)
RBC # BLD: 3.03 M/UL — LOW (ref 3.8–5.2)
RBC # FLD: 17 % — HIGH (ref 10.3–16.9)
SODIUM SERPL-SCNC: 140 MMOL/L — SIGNIFICANT CHANGE UP (ref 135–145)
WBC # BLD: 5 K/UL — SIGNIFICANT CHANGE UP (ref 3.8–10.5)
WBC # FLD AUTO: 5 K/UL — SIGNIFICANT CHANGE UP (ref 3.8–10.5)

## 2017-03-13 PROCEDURE — 84443 ASSAY THYROID STIM HORMONE: CPT

## 2017-03-13 PROCEDURE — 82728 ASSAY OF FERRITIN: CPT

## 2017-03-13 PROCEDURE — 81001 URINALYSIS AUTO W/SCOPE: CPT

## 2017-03-13 PROCEDURE — 84484 ASSAY OF TROPONIN QUANT: CPT

## 2017-03-13 PROCEDURE — 83036 HEMOGLOBIN GLYCOSYLATED A1C: CPT

## 2017-03-13 PROCEDURE — 96374 THER/PROPH/DIAG INJ IV PUSH: CPT

## 2017-03-13 PROCEDURE — 83550 IRON BINDING TEST: CPT

## 2017-03-13 PROCEDURE — 99232 SBSQ HOSP IP/OBS MODERATE 35: CPT

## 2017-03-13 PROCEDURE — 83735 ASSAY OF MAGNESIUM: CPT

## 2017-03-13 PROCEDURE — 80053 COMPREHEN METABOLIC PANEL: CPT

## 2017-03-13 PROCEDURE — 82550 ASSAY OF CK (CPK): CPT

## 2017-03-13 PROCEDURE — 36415 COLL VENOUS BLD VENIPUNCTURE: CPT

## 2017-03-13 PROCEDURE — 99285 EMERGENCY DEPT VISIT HI MDM: CPT | Mod: 25

## 2017-03-13 PROCEDURE — 85730 THROMBOPLASTIN TIME PARTIAL: CPT

## 2017-03-13 PROCEDURE — 87086 URINE CULTURE/COLONY COUNT: CPT

## 2017-03-13 PROCEDURE — 97161 PT EVAL LOW COMPLEX 20 MIN: CPT

## 2017-03-13 PROCEDURE — 85027 COMPLETE CBC AUTOMATED: CPT

## 2017-03-13 PROCEDURE — 71045 X-RAY EXAM CHEST 1 VIEW: CPT

## 2017-03-13 PROCEDURE — 93005 ELECTROCARDIOGRAM TRACING: CPT

## 2017-03-13 PROCEDURE — 82553 CREATINE MB FRACTION: CPT

## 2017-03-13 PROCEDURE — 85610 PROTHROMBIN TIME: CPT

## 2017-03-13 PROCEDURE — 85025 COMPLETE CBC W/AUTO DIFF WBC: CPT

## 2017-03-13 PROCEDURE — 81003 URINALYSIS AUTO W/O SCOPE: CPT

## 2017-03-13 PROCEDURE — 97116 GAIT TRAINING THERAPY: CPT

## 2017-03-13 PROCEDURE — 80048 BASIC METABOLIC PNL TOTAL CA: CPT

## 2017-03-13 RX ORDER — AMLODIPINE BESYLATE 2.5 MG/1
1 TABLET ORAL
Qty: 0 | Refills: 0 | COMMUNITY

## 2017-03-13 RX ORDER — POLYETHYLENE GLYCOL 3350 17 G/17G
17 POWDER, FOR SOLUTION ORAL
Qty: 238 | Refills: 0 | OUTPATIENT
Start: 2017-03-13 | End: 2017-03-27

## 2017-03-13 RX ORDER — FUROSEMIDE 40 MG
1 TABLET ORAL
Qty: 0 | Refills: 0 | COMMUNITY

## 2017-03-13 RX ORDER — LOSARTAN POTASSIUM 100 MG/1
1 TABLET, FILM COATED ORAL
Qty: 0 | Refills: 0 | COMMUNITY

## 2017-03-13 RX ORDER — INSULIN GLARGINE 100 [IU]/ML
20 INJECTION, SOLUTION SUBCUTANEOUS
Qty: 0 | Refills: 0 | COMMUNITY

## 2017-03-13 RX ORDER — INSULIN GLARGINE 100 [IU]/ML
20 INJECTION, SOLUTION SUBCUTANEOUS
Qty: 0 | Refills: 0 | COMMUNITY
Start: 2017-03-13

## 2017-03-13 RX ORDER — INSULIN GLARGINE 100 [IU]/ML
10 INJECTION, SOLUTION SUBCUTANEOUS EVERY MORNING
Qty: 0 | Refills: 0 | Status: DISCONTINUED | OUTPATIENT
Start: 2017-03-14 | End: 2017-03-13

## 2017-03-13 RX ORDER — INSULIN GLARGINE 100 [IU]/ML
15 INJECTION, SOLUTION SUBCUTANEOUS
Qty: 0 | Refills: 0 | COMMUNITY
Start: 2017-03-13

## 2017-03-13 RX ADMIN — OXYCODONE HYDROCHLORIDE 15 MILLIGRAM(S): 5 TABLET ORAL at 02:03

## 2017-03-13 RX ADMIN — Medication 50 MILLIGRAM(S): at 14:21

## 2017-03-13 RX ADMIN — OXYCODONE HYDROCHLORIDE 15 MILLIGRAM(S): 5 TABLET ORAL at 08:34

## 2017-03-13 RX ADMIN — OXYCODONE HYDROCHLORIDE 15 MILLIGRAM(S): 5 TABLET ORAL at 03:03

## 2017-03-13 RX ADMIN — Medication 100 MILLIGRAM(S): at 06:51

## 2017-03-13 RX ADMIN — Medication 2 MILLIGRAM(S): at 06:51

## 2017-03-13 RX ADMIN — OXYCODONE HYDROCHLORIDE 15 MILLIGRAM(S): 5 TABLET ORAL at 14:24

## 2017-03-13 RX ADMIN — Medication 8 UNIT(S): at 12:57

## 2017-03-13 RX ADMIN — Medication 8 UNIT(S): at 08:44

## 2017-03-13 RX ADMIN — Medication 81 MILLIGRAM(S): at 11:10

## 2017-03-13 RX ADMIN — ENOXAPARIN SODIUM 40 MILLIGRAM(S): 100 INJECTION SUBCUTANEOUS at 11:10

## 2017-03-13 RX ADMIN — Medication 50 MILLIGRAM(S): at 06:51

## 2017-03-13 RX ADMIN — OXYCODONE HYDROCHLORIDE 15 MILLIGRAM(S): 5 TABLET ORAL at 09:26

## 2017-03-13 NOTE — PROGRESS NOTE ADULT - SUBJECTIVE AND OBJECTIVE BOX
Pt. seen at 1013  Pain Management Progress Note - [facility] [phone]    HPI:  Pt. states she has pain in the left breast and under the left breast.  Pt states pain controlled with pain medication.            Pertinent PMH: Pain at: ___Back ___Neck___Knee ___Hip ___Shoulder _x__ Opioid tolerance    Pain is ___ sharp ____dull __x_burning ___achy ___ Intensity: __x__ mild __x__mod ____severe     Location _____surgical site _____cervical _____lumbar ____abd _____upper ext____lower ext + Left breast    Worse with __x__activity __x__movement _____physical therapy___ Rest    Improved with __x__medication __x__rest ____physical therapy      enoxaparin Injectable  aspirin enteric coated  fentaNYL   Patch  25 MICROgram(s)/Hr  insulin lispro Injectable (HumaLOG)  ondansetron    Tablet  metoprolol succinate ER  insulin lispro (HumaLOG) corrective regimen sliding scale  polyethylene glycol 3350  oxyCODONE IR  pregabalin  diazepam    Tablet  insulin glargine Injectable (LANTUS)      ROS: Const:  ___febrile   Eyes:___ENT:___CV: _-__chest pain  Resp: __-__sob  GI:_-__nausea __-_vomiting __+__abd pain ___npo ___clears _+__full diet __bm  :___ Musk: ___pain ___spasm  Skin:___ Neuro:  _-__verwfizs__-_fvcjlyomt____ numbness ___weakness ___paresthesia  Psych:___anxiety  Endo:___ Heme:___Allergy:___          Vital Signs Last 24 Hrs  T(C): 36.6, Max: 37.3 (03-12 @ 23:25)  T(F): 97.8, Max: 99.2 (03-12 @ 23:25)  HR: 68 (63 - 80)  BP: 110/67 (90/56 - 821/69)  BP(mean): --  RR: 18 (18 - 20)  SpO2: 95% (95% - 100%)     PHYSICAL EXAM:  Gen Appearance: _x__no acute distress _x__appropriate         Neuro: _x__SILT feet___x_ EOM Intact Psych: AAOX3__, _x__mood/affect appropriate        Eyes: _x__conjunctiva WNL  __x___ Pupils equal and round        ENT: __x_ears and nose atraumatic_x__ Hearing grossly intact        Neck: ___trachea midline, no visible masses ___thyroid without palpable mass    Resp: _x__Nml WOB____No tactile fremitus ___clear to auscultation    Cardio: ___extremities free from edema ____pedal pulses palpable    GI/Abdomen: _x__soft __x___ Nontender______Nondistended_____HSM    Lymphatic: ___no palpable nodes in neck  ___no palpable nodes calves and feet    Skin/Wound: ___Incision, ___Dressing c/d/i,   ____surrounding tissues soft,  ___drain/chest tube present____    Muscular: EHL _5__/5  Gastrocnemius___/5    __x_absent clubbing/cyanosis         ASSESSMENT:  This is a 66y old Female with a history of:  breast cancer and pain in the left breast           Recommended Treatment PLAN:  1.  Suggest  lyrica to 50 mg po TID  2.  Suggest continue fentanyl patch 25 mcg/hr q72h  3.  Suggest continue oxycodone 15 mg po q4h prn  4.  Suggest valium 2 mg po q12h PRN  5.  Suggest dilaudid 0.5 mg IV qd prn before dressing changes.

## 2017-03-13 NOTE — PROGRESS NOTE ADULT - SUBJECTIVE AND OBJECTIVE BOX
Physical Medicine and Rehabilitation Progress Note:    Patient is a 66y old  Female who presents with a chief complaint of Generalized weakness, fall and increased drainage from under left breast. (10 Mar 2017 13:45)      HPI:  Pt is a 65 yo F with Pmhx of HTN, HLD, DM2, CKD Stage 3, HF s/p AICD placement, previous MI,  Left Breast Ca s/p Lumpectomy Radiation and Chemotherapy previously in Remission with reoccurrence in November 2016 with Bone mets who presents to the ED for weakness, poor PO intake and recent fall. Per daughter at the bedside her mother had been doing relatively ok and tolerating her chemo session well. However since Her last session on Friday she has noted her mother to be weaker. Yesterday as she was standing she fell to the floor, stating that her legs gave out. The fall was witnessed by her daughter and the pt did not hit her head. The patient has been in tremendous pain due to wounds under her leftt breast. She developed ''foul smelling discharge'' under her left breast which requires wound care, however given her recent debilitating weakness she has missed her last appointments.  ROS is negative for fever, chills, productive cough, n/v, GI disturbances or dysuria. The patient's daughter who lived with her has just been diagnosed with PNA    In the ED VS: T 97.7 BP: 111-120/68-69 HR:69-75 RR:17-18 Satting 94-96 RA  ED administrations: Morphine 4 mg IV x1 (09 Mar 2017 00:35)                            8.7    5.0   )-----------( 186      ( 13 Mar 2017 06:42 )             28.4       13 Mar 2017 06:42    140    |  108    |  21     ----------------------------<  88     4.3     |  19     |  1.60     Ca    8.3        13 Mar 2017 06:42  Mg     1.8       13 Mar 2017 06:42    TPro  5.9    /  Alb  2.3    /  TBili  0.6    /  DBili  x      /  AST  19     /  ALT  15     /  AlkPhos  114    12 Mar 2017 06:27    Vital Signs Last 24 Hrs  T(C): 36.6, Max: 37.3 (03-12 @ 23:25)  T(F): 97.8, Max: 99.2 (03-12 @ 23:25)  HR: 68 (63 - 80)  BP: 110/67 (90/56 - 821/69)  BP(mean): --  RR: 18 (18 - 20)  SpO2: 95% (95% - 100%)    MEDICATIONS  (STANDING):  enoxaparin Injectable 40milliGRAM(s) SubCutaneous daily  aspirin enteric coated 81milliGRAM(s) Oral daily  fentaNYL   Patch  25 MICROgram(s)/Hr 1Patch Transdermal every 72 hours  insulin lispro Injectable (HumaLOG) 8Unit(s) SubCutaneous three times a day before meals  metoprolol succinate ER 100milliGRAM(s) Oral daily  insulin lispro (HumaLOG) corrective regimen sliding scale  SubCutaneous Before meals and at bedtime  polyethylene glycol 3350 17Gram(s) Oral daily  diazepam    Tablet 2milliGRAM(s) Oral every 12 hours  pregabalin 50milliGRAM(s) Oral every 8 hours    MEDICATIONS  (PRN):  ondansetron    Tablet 4milliGRAM(s) Oral every 8 hours PRN Nausea and/or Vomiting  oxyCODONE IR 15milliGRAM(s) Oral every 4 hours PRN Severe Pain (7 - 10)    Currently Undergoing Physical Therapy at bedside.    Functional Status Assessment:    Bed Mobility Training:                                                                                                                                                                                                                                          - Sit to Supine:         1 person minimum assist                                                                      - Supine to Sit:         with supervision     Transfer Training:  -  Sit to Stand:          1 person contact guard                                                                          - Stand to Sit:           contact guard assist    Gait Training:            ambulated 50' with a rolling walker and contact guard assist        PM&R Impression: as above    Disposition Plan Recommendations: d/c home with home physical therapy

## 2017-03-13 NOTE — PROGRESS NOTE ADULT - ASSESSMENT
67 Yo F with PMhx  HTN, HLD, DM2, CKD Stage 3, HF s/p AICD placement, previous MI,  Left Breast Ca s/p Lumpectomy Radiation and Chemotherapy previously in Remission with reoccurrence in November 2016 with Bone mets presenting with weakness and decrease PO intake in setting of Breast Malignancy    Problem/Plan - 1:  ·  Problem: Weakness.  Plan: -on admission, patient reported recent h/o weakness, poor PO intake and recent mechanical fall. TSH WNL.   -etiology likely 2/2 deconditioning vs underlying malignancy vs chemotherapy side effects   -c/w supportive care, encourage PO
Pt is a 65 yo F with Pmhx of HTN, HLD, DM2, CKD Stage 3, HF s/p AICD placement, previous MI,  Left Breast Ca s/p Lumpectomy Radiation and Chemotherapy previously in Remission with reoccurrence in November 2016 with Bone mets
Pt is a 67 yo F with Pmhx of HTN, HLD, DM2, CKD Stage 3, HF s/p AICD placement, previous MI,  Left Breast Ca s/p Lumpectomy Radiation and Chemotherapy previously in Remission with reoccurrence in November 2016 with Bone mets
Pt is a 67 yo F with Pmhx of HTN, HLD, DM2, CKD Stage 3, HF s/p AICD placement, previous MI,  Left Breast Ca s/p Lumpectomy Radiation and Chemotherapy previously in Remission with reoccurrence in November 2016 with Bone mets     Problem/Plan - 1:  ·  Problem: Diabetes.  Plan: sliding scale with Lantus and Humalog.     Problem/Plan - 2:  ·  Problem: Metastatic breast cancer.  Plan: continue wound care  out-patient oncologist needs to be contacted regarding any recent treatment.
Pt is a 67 yo F with Pmhx of HTN, HLD, DM2, CKD Stage 3, HF s/p AICD placement, previous MI,  Left Breast Ca s/p Lumpectomy Radiation and Chemotherapy previously in Remission with reoccurrence in November 2016 with Bone mets   now with non-oliguric ZABRINA
Pt is a 67 yo F with Pmhx of HTN, HLD, DM2, CKD Stage 3, HF s/p AICD placement, previous MI,  Left Breast Ca s/p Lumpectomy Radiation and Chemotherapy previously in Remission with reoccurrence in November 2016 with Bone mets   now with non-oliguric ZABRINA    Problem/Plan - 1:  ·  Problem: ZABRINA (acute kidney injury).resolvign  likely pre-renal in origin  encourage PO intake      Problem/Plan - 2:  ·  Problem: Metastatic breast cancer.  Plan: supportive care.     Problem/Plan - 3:  ·  Problem: Diabetes.  Plan: Lantus and Humalog     DC planning to home.
67 Yo F with PMhx  HTN, HLD, DM2, CKD Stage 3, HF s/p AICD placement, previous MI,  Left Breast Ca s/p Lumpectomy Radiation and Chemotherapy previously in Remission with reoccurrence in November 2016 with Bone mets presenting with weakness and decrease PO intake in setting of Breast Malignancy

## 2017-03-13 NOTE — PROGRESS NOTE ADULT - SUBJECTIVE AND OBJECTIVE BOX
INTERVAL HPI/OVERNIGHT EVENTS:    Feels well, more alert.  No issues overnight.    MEDICATIONS  (STANDING):  enoxaparin Injectable 40milliGRAM(s) SubCutaneous daily  aspirin enteric coated 81milliGRAM(s) Oral daily  fentaNYL   Patch  25 MICROgram(s)/Hr 1Patch Transdermal every 72 hours  insulin lispro Injectable (HumaLOG) 8Unit(s) SubCutaneous three times a day before meals  metoprolol succinate ER 100milliGRAM(s) Oral daily  insulin lispro (HumaLOG) corrective regimen sliding scale  SubCutaneous Before meals and at bedtime  polyethylene glycol 3350 17Gram(s) Oral daily  diazepam    Tablet 2milliGRAM(s) Oral every 12 hours  pregabalin 50milliGRAM(s) Oral every 8 hours    MEDICATIONS  (PRN):  ondansetron    Tablet 4milliGRAM(s) Oral every 8 hours PRN Nausea and/or Vomiting  oxyCODONE IR 15milliGRAM(s) Oral every 4 hours PRN Severe Pain (7 - 10)      Allergies    codeine (Unknown)        REVIEW OF SYSTEMS:  CONSTITUTIONAL:  No night sweats.  Some fatigue,  No fever or chills..  RESPIRATORY:  No cough.  No wheeze.    No shortness of breath.  CARDIOVASCULAR:  No chest pains.  No palpitations.  GASTROINTESTINAL:  No abdominal pain.  No nausea or vomiting.  No diarrhea or constipation.    GENITOURINARY:  No urgency.  No frequency.  No dysuria.  No hematuria.    MUSCULOSKELETAL:  .  No joint swelling.    SKIN:  No rashes.  No lesions.  No wounds.      T(C): 36.6, Max: 37.3 (03-12 @ 23:25)  HR: 68 (63 - 80)  BP: 110/67 (90/56 - 821/69)  RR: 18 (18 - 20)  SpO2: 95% (95% - 100%)  Wt(kg): --    PHYSICAL EXAM:   General - NAD, Alert and oriented x 3,   Eyes - PERRLA, EOM intact  Neck - - No lymphadenopathy  Cardiovascular - RRR no m/r/g, no JVD  Lungs - Clear to auscltation, no use of acessory muscles, no crackles or wheezes.  Skin - No rashes, skin warm and dry, no erythematous areas  Abdomen - Normal bowel sounds, abdomen soft and nontender  Genito Urinary – Genital exam not performed since complaints not related.  Extremeties - No edema  LABS:                        8.7    5.0   )-----------( 186      ( 13 Mar 2017 06:42 )             28.4     13 Mar 2017 06:42    140    |  108    |  21     ----------------------------<  88     4.3     |  19     |  1.60     Ca    8.3        13 Mar 2017 06:42  Mg     1.8       13 Mar 2017 06:42    TPro  5.9    /  Alb  2.3    /  TBili  0.6    /  DBili  x      /  AST  19     /  ALT  15     /  AlkPhos  114    12 Mar 2017 06:27          RADIOLOGY & ADDITIONAL TESTS:

## 2017-03-16 DIAGNOSIS — Z79.4 LONG TERM (CURRENT) USE OF INSULIN: ICD-10-CM

## 2017-03-16 DIAGNOSIS — I89.0 LYMPHEDEMA, NOT ELSEWHERE CLASSIFIED: ICD-10-CM

## 2017-03-16 DIAGNOSIS — E11.40 TYPE 2 DIABETES MELLITUS WITH DIABETIC NEUROPATHY, UNSPECIFIED: ICD-10-CM

## 2017-03-16 DIAGNOSIS — C79.51 SECONDARY MALIGNANT NEOPLASM OF BONE: ICD-10-CM

## 2017-03-16 DIAGNOSIS — N18.3 CHRONIC KIDNEY DISEASE, STAGE 3 (MODERATE): ICD-10-CM

## 2017-03-16 DIAGNOSIS — E86.0 DEHYDRATION: ICD-10-CM

## 2017-03-16 DIAGNOSIS — R62.7 ADULT FAILURE TO THRIVE: ICD-10-CM

## 2017-03-16 DIAGNOSIS — N17.9 ACUTE KIDNEY FAILURE, UNSPECIFIED: ICD-10-CM

## 2017-03-16 DIAGNOSIS — Z79.82 LONG TERM (CURRENT) USE OF ASPIRIN: ICD-10-CM

## 2017-03-16 DIAGNOSIS — Z95.810 PRESENCE OF AUTOMATIC (IMPLANTABLE) CARDIAC DEFIBRILLATOR: ICD-10-CM

## 2017-03-16 DIAGNOSIS — I50.9 HEART FAILURE, UNSPECIFIED: ICD-10-CM

## 2017-03-16 DIAGNOSIS — R26.89 OTHER ABNORMALITIES OF GAIT AND MOBILITY: ICD-10-CM

## 2017-03-16 DIAGNOSIS — G89.3 NEOPLASM RELATED PAIN (ACUTE) (CHRONIC): ICD-10-CM

## 2017-03-16 DIAGNOSIS — E11.22 TYPE 2 DIABETES MELLITUS WITH DIABETIC CHRONIC KIDNEY DISEASE: ICD-10-CM

## 2017-03-16 DIAGNOSIS — I13.0 HYPERTENSIVE HEART AND CHRONIC KIDNEY DISEASE WITH HEART FAILURE AND STAGE 1 THROUGH STAGE 4 CHRONIC KIDNEY DISEASE, OR UNSPECIFIED CHRONIC KIDNEY DISEASE: ICD-10-CM

## 2017-03-16 DIAGNOSIS — Z88.8 ALLERGY STATUS TO OTHER DRUGS, MEDICAMENTS AND BIOLOGICAL SUBSTANCES STATUS: ICD-10-CM

## 2017-03-16 DIAGNOSIS — I25.2 OLD MYOCARDIAL INFARCTION: ICD-10-CM

## 2017-03-16 DIAGNOSIS — C79.81 SECONDARY MALIGNANT NEOPLASM OF BREAST: ICD-10-CM

## 2017-04-05 ENCOUNTER — APPOINTMENT (OUTPATIENT)
Dept: HEART AND VASCULAR | Facility: CLINIC | Age: 67
End: 2017-04-05

## 2017-04-11 ENCOUNTER — MESSAGE (OUTPATIENT)
Age: 67
End: 2017-04-11

## 2017-12-28 NOTE — CONSULT NOTE ADULT - ASSESSMENT
Problem: Self Care Deficits Care Plan (Adult)  Goal: *Acute Goals and Plan of Care (Insert Text)  1. Patient will verbalize and demonstrate understanding of spinal precautions with 100% accuracy during ADLs. 2. Patient will complete lower body bathing and dressing with setup and adaptive equipment as needed. 3. Patient will complete functional transfers with supervision and adaptive equipment as needed. 4. Patient will complete toileting and toilet transfer with supervision. 5. Patient will complete functional mobility of household distances with supervision and adaptive equipment as needed. 6. Patient will demonstrate ability to log roll in bed with modified independence and no verbal cues from therapist.     Timeframe: 7 visits       OCCUPATIONAL THERAPY: Initial Assessment 12/28/2017  INPATIENT: Hospital Day: 6  Payor: Trinh Trevino / Plan: [id number] / Product Type: PPO /      NAME/AGE/GENDER: Chelsey Bhat is a 39 y.o. female   PRIMARY DIAGNOSIS:  BULGING DISC  Cauda equina syndrome with neurogenic bladder (HCC)  Cauda equina syndrome with neurogenic bladder (HCC) <principal problem not specified> <principal problem not specified>  Procedure(s) (LRB):  SPINE LUMBAR LAMINECTOMY MINIMALLY INVASIVE LEFT SIDE (Left)  5 Days Post-Op  ICD-10: Treatment Diagnosis:    · Low Back Pain (M54.5)   Precautions/Allergies:    Spinal Precautions   Doxycycline; Pcn [penicillins]; and Percocet [oxycodone-acetaminophen]      ASSESSMENT:     Ms. Danice Sandhoff is a 39year old female admitted with low back pain, found to have HNP at L4-5, now s/p above procedure. At baseline patient lives with spouse and her 2 children (ages 15&11). 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 Patient supine in bed upon arrival, agreeable to OT evaluation. Reports pain 5/10 in back. Alert and oriented x4. Educated patient on spinal precautions and log roll technique.  Able to complete log roll with supervision, stand with CGA and RW. Theraband used to increase dorsiflexion on LLE only today. Patient able to complete functional mobility in room with RW and CGA/ minimal assistance. Patient noted to have resting plantar flexion in BLE when in bed and would benefit from [facility] when in bed to prevent future complications/ contractures. Patient is currently requiring assistance with all lower body ADLs and functional transfers. She would benefit from continued occupational therapy to increase independence and safety. She is pleasant and motivated to regain her independence. Will follow. This section established at most recent assessment   PROBLEM LIST (Impairments causing functional limitations):  1. Decreased Strength  2. Decreased ADL/Functional Activities  3. Decreased Transfer Abilities  4. Decreased Ambulation Ability/Technique  5. Decreased Balance  6. Increased Pain  7. Decreased Flexibility/Joint Mobility  8. Decreased Knowledge of Precautions   INTERVENTIONS PLANNED: (Benefits and precautions of occupational therapy have been discussed with the patient.)  1. Activities of daily living training  2. Adaptive equipment training  3. Therapeutic activity  4. Therapeutic exercise     TREATMENT PLAN: Frequency/Duration: Follow patient 3x/ week to address above goals. Rehabilitation Potential For Stated Goals: Excellent     RECOMMENDED REHABILITATION/EQUIPMENT: (at time of discharge pending progress): Due to the probability of continued deficits (see above) this patient will likely need continued skilled occupational therapy after discharge. Equipment:    to be determined pending progress              OCCUPATIONAL PROFILE AND HISTORY:   History of Present Injury/Illness (Reason for Referral):  Per H&P, \"Jazmine Cooney is a 39 y.o.  female who presents with a long H/O low back problems. She has a known lumbar disc problem that she has treated conservatively for years.   However, yesterday she was getting out of bed and felt a pop in her back. She had severe LBP and BLE pain all day, L>R. This am, she awoke with urinary incontinence, numbness in the LLE in no specific distribution, and weakness in the LLE to the point that she cannot walk. She has also developed numbness in the saddle region and most recently, while in MRI, numbness in the RLE. The pain has involved all muscle groups in BLEs. She did not feel the Wilson going in just prior to this exam.\"  Past Medical History/Comorbidities:   Ms. Gene Medina  has a past medical history of Asthma and Back pain. Ms. Gene Medina  has a past surgical history that includes hx cyst removal and hx tympanostomy. Social History/Living Environment:   Home Environment: Private residence  # Steps to Enter: 0  One/Two Story Residence: One story  Living Alone: No  Support Systems: Spouse/Significant Other/Partner, Child(mackenzie)  Patient Expects to be Discharged to[de-identified] Unknown  Current DME Used/Available at Home: None  Tub or Shower Type: Tub/Shower combination  Prior Level of Function/Work/Activity:  Patient lives with spouse and 2 children, ages 6 and 13. She is typically independent with all ADLs and mobility. She drives and works. Dominant Side:         RIGHT  Personal Factors:          Sex:  female        Age:  39 y.o. Profession:  Medical assistant    Number of Personal Factors/Comorbidities that affect the Plan of Care: Brief history (0):  LOW COMPLEXITY   ASSESSMENT OF OCCUPATIONAL PERFORMANCE[de-identified]   Activities of Daily Living:           Basic ADLs (From Assessment) Complex ADLs (From Assessment)   Basic ADL  Feeding: Setup  Oral Facial Hygiene/Grooming: Setup  Bathing: Moderate assistance  Upper Body Dressing: Setup  Lower Body Dressing: Moderate assistance  Toileting: Moderate assistance Instrumental ADL  Meal Preparation: Maximum assistance  Homemaking:  Total assistance  Medication Management: Independent  Financial Management: Independent Grooming/Bathing/Dressing Activities of Daily Living     Cognitive Retraining  Safety/Judgement: Awareness of environment; Fall prevention; Insight into deficits                       Bed/Mat Mobility  Supine to Sit: Supervision  Sit to Stand: Contact guard assistance  Bed to Chair: Minimum assistance  Scooting: Supervision       Most Recent Physical Functioning:   Gross Assessment:  AROM: Within functional limits (BUE)  Strength: Within functional limits (BUE 5/5)  Coordination: Within functional limits (BUE)  Sensation: Intact (BUE)               Posture:  Posture (WDL): Exceptions to WDL  Posture Assessment: Forward head, Rounded shoulders  Balance:  Sitting: Intact  Standing: Impaired  Standing - Static: Fair  Standing - Dynamic : Fair Bed Mobility:  Supine to Sit: Supervision  Scooting: Supervision  Wheelchair Mobility:     Transfers:  Sit to Stand: Contact guard assistance  Stand to Sit: Contact guard assistance  Bed to Chair: Minimum assistance                Patient Vitals for the past 6 hrs:   BP BP Patient Position SpO2 Pulse   12/28/17 0349 118/69 At rest 95 % 87   12/28/17 0743 109/63 Sitting 97 % 85       Mental Status  Neurologic State: Alert  Orientation Level: Oriented X4, Appropriate for age  Cognition: Appropriate decision making, Follows commands  Perception: Appears intact  Perseveration: No perseveration noted  Safety/Judgement: Awareness of environment, Fall prevention, Insight into deficits                          Physical Skills Involved:  1. Range of Motion  2. Balance  3. Strength  4. Sensation  5. Pain (acute) Cognitive Skills Affected (resulting in the inability to perform in a timely and safe manner):  1. None Psychosocial Skills Affected:  1. Habits/Routines  2. Environmental Adaptation  3. Social Interaction  4. Self-Awareness  5.  Social Roles   Number of elements that affect the Plan of Care: 5+:  HIGH COMPLEXITY   CLINICAL DECISION MAKING:   [place] Daily Activity Inpatient Short Form  How much help from another person does the patient currently need. .. Total A Lot A Little None   1. Putting on and taking off regular lower body clothing? [] 1   [x] 2   [] 3   [] 4   2. Bathing (including washing, rinsing, drying)? [] 1   [x] 2   [] 3   [] 4   3. Toileting, which includes using toilet, bedpan or urinal?   [] 1   [x] 2   [] 3   [] 4   4. Putting on and taking off regular upper body clothing? [] 1   [] 2   [] 3   [x] 4   5. Taking care of personal grooming such as brushing teeth? [] 1   [] 2   [] 3   [x] 4   6. Eating meals? [] 1   [] 2   [] 3   [x] 4   © 2007, Trustees of [address] [place] [id number], under license to [employer]. All rights reserved      Score:  Initial: 20 Most Recent: X (Date: -- )    Interpretation of Tool:  Represents activities that are increasingly more difficult (i.e. Bed mobility, Transfers, Gait). Score 24 23 22-20 19-15 14-10 9-7 6     Modifier CH CI CJ CK CL CM CN      ? Self Care:     - CURRENT STATUS: CJ - 20%-39% impaired, limited or restricted    - GOAL STATUS: CI - 1%-19% impaired, limited or restricted    - D/C STATUS:  ---------------To be determined---------------  Payor: BLUE CROSS / Plan: [place] [address] / Product Type: PPO /      Medical Necessity:     · Patient demonstrates excellent rehab potential due to higher previous functional level. Reason for Services/Other Comments:  · Patient continues to require present interventions due to patient's inability to care for self at baseline level.    Use of outcome tool(s) and clinical judgement create a POC that gives a: MODERATE COMPLEXITY         TREATMENT:   (In addition to Assessment/Re-Assessment sessions the following treatments were rendered)     Pre-treatment Symptoms/Complaints:  None   Pain: Initial:   Pain Intensity 1: 5  Pain Location 1: Back  Pain Intervention(s) 1: Repositioned  Post Session:  Same 67 Yo F with PMhx as mentioned above presenting with weakness and decrease PO intake in setting of Breast Malignancy    Problem/Plan - 1:  ·  Problem: Weakness.  Plan: Pt presenting with worsening weakness, poor PO intake and recent mechanical fall. Pt's weakness likely multifactorial: Metastatic breast cancer, immunosuppression from chemotherapy and deconditioning  -Pt looks significantly dry on physical exam, Will start on Maintaince fluid.  -TSH added pending for further eval of weakness  -Pt consult in the am.     Problem/Plan - 2:  ·  Problem: Metastatic breast cancer.  Plan: Pt with Metastatic left breast cancer to the Bone.  -Left breast significantly hardened with darkening of the skin. Breast is almost immobile. There are skin wounds under the left breast tissue with strong Malodorous discharge. Pt also with significant lymphedema of her LUE as well as tenderness  -Wound care in the morning  -Cont with Morphine and Percocet PRN for pain  -Contact outpatient Oncologist in the am for further collateral. Dr Zhu has not seen the patient in clinic in over a year  -Pt would like to remain full code. Assessment/Reassessment only, no treatment provided today    Braces/Orthotics/Lines/Etc:   · muniz catheter  · O2 Device: Room air  Treatment/Session Assessment:    · Response to Treatment:  Tolerated well; motivated   · Interdisciplinary Collaboration:   o Occupational Therapist  o Registered Nurse  o   · After treatment position/precautions:   o Up in chair  o Bed/Chair-wheels locked  o Bed in low position  o Call light within reach  o RN notified  o Family at bedside   · Compliance with Program/Exercises: compliant all of the time. · Recommendations/Intent for next treatment session: \"Next visit will focus on advancements to more challenging activities and reduction in assistance provided\".   Total Treatment Duration:  OT Patient Time In/Time Out  Time In: 0854  Time Out: [address] [facility] EDITH Hernández/L

## 2018-05-31 NOTE — PATIENT PROFILE ADULT. - SOURCE OF INFORMATION, PROFILE
patient/family/Patient's daughter I have reviewed and confirmed nurses' notes for patient's medications, allergies, medical history, and surgical history.

## 2020-01-06 NOTE — PROGRESS NOTE ADULT - PROBLEM SELECTOR PROBLEM 1
Weakness
Weakness
Diabetes
Pain
ZABRINA (acute kidney injury)
Breathing spontaneous and unlabored. Breath sounds clear and equal bilaterally with regular rhythm.

## 2022-01-12 NOTE — ED PROVIDER NOTE - INPATIENT RESIDENT/ACP NOTIFIED
Dr. Ch Prednisone Counseling:  I discussed with the patient the risks of prolonged use of prednisone including but not limited to weight gain, insomnia, osteoporosis, mood changes, diabetes, susceptibility to infection, glaucoma and high blood pressure.  In cases where prednisone use is prolonged, patients should be monitored with blood pressure checks, serum glucose levels and an eye exam.  Additionally, the patient may need to be placed on GI prophylaxis, PCP prophylaxis, and calcium and vitamin D supplementation and/or a bisphosphonate.  The patient verbalized understanding of the proper use and the possible adverse effects of prednisone.  All of the patient's questions and concerns were addressed.